# Patient Record
Sex: FEMALE | Race: OTHER | HISPANIC OR LATINO
[De-identification: names, ages, dates, MRNs, and addresses within clinical notes are randomized per-mention and may not be internally consistent; named-entity substitution may affect disease eponyms.]

---

## 2023-04-25 PROBLEM — Z00.00 ENCOUNTER FOR PREVENTIVE HEALTH EXAMINATION: Status: ACTIVE | Noted: 2023-04-25

## 2023-04-28 ENCOUNTER — NON-APPOINTMENT (OUTPATIENT)
Age: 68
End: 2023-04-28

## 2023-04-28 ENCOUNTER — APPOINTMENT (OUTPATIENT)
Dept: COLORECTAL SURGERY | Facility: CLINIC | Age: 68
End: 2023-04-28
Payer: MEDICARE

## 2023-04-28 VITALS
DIASTOLIC BLOOD PRESSURE: 74 MMHG | BODY MASS INDEX: 25.4 KG/M2 | TEMPERATURE: 98.5 F | WEIGHT: 126 LBS | OXYGEN SATURATION: 100 % | SYSTOLIC BLOOD PRESSURE: 144 MMHG | HEIGHT: 59 IN | HEART RATE: 85 BPM

## 2023-04-28 DIAGNOSIS — R73.03 PREDIABETES.: ICD-10-CM

## 2023-04-28 DIAGNOSIS — Z82.49 FAMILY HISTORY OF ISCHEMIC HEART DISEASE AND OTHER DISEASES OF THE CIRCULATORY SYSTEM: ICD-10-CM

## 2023-04-28 DIAGNOSIS — M81.0 AGE-RELATED OSTEOPOROSIS W/OUT CURRENT PATHOLOGICAL FRACTURE: ICD-10-CM

## 2023-04-28 DIAGNOSIS — Z83.3 FAMILY HISTORY OF DIABETES MELLITUS: ICD-10-CM

## 2023-04-28 DIAGNOSIS — K62.1 RECTAL POLYP: ICD-10-CM

## 2023-04-28 DIAGNOSIS — Z80.49 FAMILY HISTORY OF MALIGNANT NEOPLASM OF OTHER GENITAL ORGANS: ICD-10-CM

## 2023-04-28 DIAGNOSIS — Z85.3 PERSONAL HISTORY OF MALIGNANT NEOPLASM OF BREAST: ICD-10-CM

## 2023-04-28 PROCEDURE — 99204 OFFICE O/P NEW MOD 45 MIN: CPT

## 2023-04-28 RX ORDER — ANASTROZOLE TABLETS 1 MG/1
1 TABLET ORAL DAILY
Refills: 0 | Status: ACTIVE | COMMUNITY

## 2023-04-28 RX ORDER — ALENDRONATE SODIUM 70 MG/1
70 TABLET ORAL
Refills: 0 | Status: ACTIVE | COMMUNITY

## 2023-04-28 NOTE — REVIEW OF SYSTEMS
[Abdominal Pain] : abdominal pain [Negative] : Heme/Lymph [Fever] : no fever [Chills] : no chills [Feeling Poorly] : not feeling poorly [Feeling Tired] : not feeling tired [Recent Weight Gain (___ Lbs)] : no recent weight gain [Recent Weight Loss (___ Lbs)] : no recent weight loss [Eye Pain] : no eye pain [Red Eyes] : eyes not red [Eyesight Problems] : no eyesight problems [Discharge From Eyes] : no purulent discharge from the eyes [Dry Eyes] : no dryness of the eyes [Eyes Itch] : no itching of the eyes [Earache] : no earache [Loss Of Hearing] : no hearing loss [Nosebleeds] : no nosebleeds [Nasal Discharge] : no nasal discharge [Sore Throat] : no sore throat [Hoarseness] : no hoarseness [Heart Rate Is Slow] : the heart rate was not slow [Heart Rate Is Fast] : the heart rate was not fast [Chest Pain] : no chest pain [Palpitations] : no palpitations [Leg Claudication] : no intermittent leg claudication [Lower Ext Edema] : no lower extremity edema [Shortness Of Breath] : no shortness of breath [Wheezing] : no wheezing [Cough] : no cough [SOB on Exertion] : no shortness of breath during exertion [Orthopnea] : no orthopnea [PND] : no PND [Vomiting] : no vomiting [Constipation] : no constipation [Diarrhea] : no diarrhea [Heartburn] : no heartburn [Melena] : no melena [Dysuria] : no dysuria [Incontinence] : no incontinence [Pelvic Pain] : no pelvic pain [Dysmenorrhea] : no dysmenorrhea [Vaginal Discharge] : no vaginal discharge [Abn Vaginal Bleeding] : no unexplained vaginal bleeding [Joint Swelling] : no joint swelling [Joint Stiffness] : no joint stiffness [Limb Pain] : no limb pain [Limb Swelling] : no limb swelling [Confused] : no confusion [Dizziness] : no dizziness [Convulsions] : no convulsions [Fainting] : no fainting [Suicidal] : not suicidal [Anxiety] : no anxiety [Depression] : no depression [Easy Bleeding] : no tendency for easy bleeding [Easy Bruising] : no tendency for easy bruising

## 2023-04-28 NOTE — HISTORY OF PRESENT ILLNESS
[FreeTextEntry1] : : [id number]\par PCP: Dr. Jesusita Figueroa \[facility] Oncologist: Dr. Kohli, at [facility] \par 66 y/o F, referred by Dr. Delarosa for 20 mm polyp in distal rectum during colonoscopy on 3/20/23. Polyp was removed with hot snare , but resection was incomplete. 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 Patient c/o mild abdominal pain in RLQ and cramp in lower abdomen. Pain is relieved with bowel movements and gets worse with fatty food. Pain has been there for more than a month. Bowel movements are daily x1 and soft, but stool is narrow/thin. Its been like that forever. Needs to strain sometimes. When constipated, no BM for 2 days. Last constipation was 2 weeks ago. Took a laxative that was prescribed by Dr. Delarosa. Denies rectal bleeding or pain with BM. Denies any urinary symptoms. \par Patient was diagnosed with right breast cancer in 2019, had chemotherapy 6 session. Had b/l mastectomy in 2020 and Breast implant in 2021.

## 2023-04-28 NOTE — ASSESSMENT
[FreeTextEntry1] : Partially removed dysplastic rectal polyp in distal rectum.  \par Patient not prepped and I cannot feel on digital (also stool in rectum above).\par Will return after flex sig prep so I can see and find the lesion.  \par Flex sig soon and that will determine what we recommend.  \par Transanal (standard via anoscope) vs endoscopic vs TEM removal are the options.  Need to see and assess lesion before hand.  \par \par Will need medical clearance. \par \par \par \par \par \par

## 2023-04-28 NOTE — PHYSICAL EXAM
[Normal Breath Sounds] : Normal breath sounds [Normal Heart Sounds] : normal heart sounds [2+] : left 2+ [Alert] : alert [No Rash or Lesion] : No rash or lesion [Oriented to Person] : oriented to person [Oriented to Place] : oriented to place [Oriented to Time] : oriented to time [Calm] : calm [Abdomen Masses] : No abdominal masses [Abdomen Tenderness] : ~T No ~M abdominal tenderness [de-identified] : benign, no masses,  [de-identified] : NAD [de-identified] : digital: cannot feel the lesion.  Able to get just above the sphincter .  no anoscopy

## 2023-05-01 ENCOUNTER — APPOINTMENT (OUTPATIENT)
Dept: COLORECTAL SURGERY | Facility: CLINIC | Age: 68
End: 2023-05-01
Payer: MEDICARE

## 2023-05-01 VITALS
SYSTOLIC BLOOD PRESSURE: 129 MMHG | TEMPERATURE: 98.1 F | DIASTOLIC BLOOD PRESSURE: 80 MMHG | BODY MASS INDEX: 24.8 KG/M2 | WEIGHT: 123 LBS | OXYGEN SATURATION: 98 % | HEIGHT: 59 IN | HEART RATE: 92 BPM

## 2023-05-01 PROCEDURE — 45330 DIAGNOSTIC SIGMOIDOSCOPY: CPT

## 2023-05-01 NOTE — REVIEW OF SYSTEMS
[Negative] : Heme/Lymph [Fever] : no fever [Chills] : no chills [Feeling Poorly] : not feeling poorly [Feeling Tired] : not feeling tired [Recent Weight Gain (___ Lbs)] : no recent weight gain [Recent Weight Loss (___ Lbs)] : no recent weight loss [Eye Pain] : no eye pain [Red Eyes] : eyes not red [Eyesight Problems] : no eyesight problems [Discharge From Eyes] : no purulent discharge from the eyes [Dry Eyes] : no dryness of the eyes [Eyes Itch] : no itching of the eyes [Earache] : no earache [Loss Of Hearing] : no hearing loss [Nosebleeds] : no nosebleeds [Nasal Discharge] : no nasal discharge [Sore Throat] : no sore throat [Hoarseness] : no hoarseness [Heart Rate Is Slow] : the heart rate was not slow [Heart Rate Is Fast] : the heart rate was not fast [Chest Pain] : no chest pain [Palpitations] : no palpitations [Leg Claudication] : no intermittent leg claudication [Lower Ext Edema] : no lower extremity edema [Shortness Of Breath] : no shortness of breath [Wheezing] : no wheezing [Cough] : no cough [SOB on Exertion] : no shortness of breath during exertion [Orthopnea] : no orthopnea [PND] : no PND [Abdominal Pain] : no abdominal pain [Vomiting] : no vomiting [Constipation] : no constipation [Diarrhea] : no diarrhea [Heartburn] : no heartburn [Melena] : no melena [Dysuria] : no dysuria [Incontinence] : no incontinence [Pelvic Pain] : no pelvic pain [Dysmenorrhea] : no dysmenorrhea [Vaginal Discharge] : no vaginal discharge [Abn Vaginal Bleeding] : no unexplained vaginal bleeding [Anxiety] : no anxiety [Depression] : no depression [Easy Bleeding] : no tendency for easy bleeding [Easy Bruising] : no tendency for easy bruising

## 2023-05-01 NOTE — HISTORY OF PRESENT ILLNESS
[FreeTextEntry1] : 66 y/o F, referred by Dr. Delarosa for 20 mm polyp in distal rectum during colonoscopy on 3/20/23. Polyp was removed with hot snare , but resection was incomplete. 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 Patient is here for flexible sigmoidoscopy today.

## 2023-05-01 NOTE — ASSESSMENT
[FreeTextEntry1] : Lesion located. \par In distal rectum on left lateral (and tad posterior) location\par 3 x 2.5 cm starting 1-2 cm above the dentate line.\par ESD/EMR vs transanal standard or TEM methods proposed.\par Risks and benefits explained via  and via Ana Becerril.\par Need to f/u flex sig q 3-4 months explained.\par All questions answered.\par Needs medical clearance\par Bowel prep and antibiotics po.   \par Risk of finding cancer understood.  Might need other treatment after surgery.\par Risk of recurrence or persistence explained\par Shown ESD slide show.

## 2023-05-01 NOTE — PROCEDURE
[FreeTextEntry1] : Flexible sigmoidoscopy done in office post consent\par prepped at home\par Left lateral decubitus position: \par Digital first: no masses, tone WNL\par Exam to 25 cm into sigmoid colon which had diverticulosis.\par Prep was good to excellent, some mucus noted\par lesion seen in left lateral (with posterior extension a short distance) position with distal edge about 1-2 cm above dentate line.\par Estimated size: 3 x 2.5 cm.  Looks villous, clearly sessile\par No ulcerations noted.  \par Tolerated exam well.\par Photos taken

## 2023-05-01 NOTE — PHYSICAL EXAM
[Excoriation] : no perianal excoriation [Fistula] : no fistulas [Wart] : no warts [Ulcer ___ cm] : no ulcers [Nonprolapsing] : a nonprolapsing (grade I) [Tender, Swollen] : nontender, non-swollen [Thrombosed] : that was not thrombosed [Skin Tags] : there were no residual hemorrhoidal skin tags seen [Normal] : was normal [None] : there was no rectal mass  [Stool Sample Taken] : no stool obtained on rectal exam [Gross Blood] : no gross blood [de-identified] : digital: tone WNL, long thin canal.  not readily palpable.  see sigoidoscopy report below

## 2023-05-08 RX ORDER — METRONIDAZOLE 500 MG/1
500 TABLET ORAL
Qty: 6 | Refills: 0 | Status: ACTIVE | COMMUNITY
Start: 2023-05-08 | End: 1900-01-01

## 2023-05-09 RX ORDER — NEOMYCIN SULFATE 500 MG/1
500 TABLET ORAL
Qty: 6 | Refills: 0 | Status: DISCONTINUED | COMMUNITY
Start: 2023-05-08 | End: 2023-05-09

## 2023-05-09 RX ORDER — NEOMYCIN SULFATE 500 MG/1
500 TABLET ORAL
Qty: 6 | Refills: 0 | Status: ACTIVE | COMMUNITY
Start: 2023-05-09 | End: 1900-01-01

## 2023-05-19 ENCOUNTER — RESULT REVIEW (OUTPATIENT)
Age: 68
End: 2023-05-19

## 2023-05-19 ENCOUNTER — OUTPATIENT (OUTPATIENT)
Dept: OUTPATIENT SERVICES | Facility: HOSPITAL | Age: 68
LOS: 1 days | End: 2023-05-19
Payer: MEDICARE

## 2023-05-19 DIAGNOSIS — K62.1 RECTAL POLYP: ICD-10-CM

## 2023-05-19 LAB — SURGICAL PATHOLOGY STUDY: SIGNIFICANT CHANGE UP

## 2023-05-19 PROCEDURE — 88321 CONSLTJ&REPRT SLD PREP ELSWR: CPT

## 2023-05-22 RX ORDER — ALENDRONATE SODIUM 70 MG/1
1 TABLET ORAL
Refills: 0 | DISCHARGE

## 2023-05-22 NOTE — ASU PATIENT PROFILE, ADULT - NSICDXPASTSURGICALHX_GEN_ALL_CORE_FT
PAST SURGICAL HISTORY:  H/O abdominoplasty     H/O mastectomy 2019 b/l    H/O ovarian cystectomy right    H/O:  x2

## 2023-05-22 NOTE — ASU PATIENT PROFILE, ADULT - NSICDXPASTMEDICALHX_GEN_ALL_CORE_FT
PAST MEDICAL HISTORY:  Breast cancer 2019    Osteoporosis      PAST MEDICAL HISTORY:  Breast cancer 2019    GERD (gastroesophageal reflux disease)     Osteoporosis      PAST MEDICAL HISTORY:  Breast cancer 2019    GERD (gastroesophageal reflux disease)     Osteoporosis     Prediabetes

## 2023-05-22 NOTE — ASU PATIENT PROFILE, ADULT - FALL HARM RISK - HARM RISK INTERVENTIONS

## 2023-05-23 ENCOUNTER — TRANSCRIPTION ENCOUNTER (OUTPATIENT)
Age: 68
End: 2023-05-23

## 2023-05-23 ENCOUNTER — RESULT REVIEW (OUTPATIENT)
Age: 68
End: 2023-05-23

## 2023-05-23 ENCOUNTER — INPATIENT (INPATIENT)
Facility: HOSPITAL | Age: 68
LOS: 2 days | Discharge: HOME | DRG: 393 | End: 2023-05-26
Attending: COLON & RECTAL SURGERY | Admitting: COLON & RECTAL SURGERY
Payer: MEDICARE

## 2023-05-23 ENCOUNTER — APPOINTMENT (OUTPATIENT)
Dept: COLORECTAL SURGERY | Facility: HOSPITAL | Age: 68
End: 2023-05-23
Payer: MEDICARE

## 2023-05-23 VITALS
RESPIRATION RATE: 18 BRPM | WEIGHT: 119.49 LBS | SYSTOLIC BLOOD PRESSURE: 121 MMHG | OXYGEN SATURATION: 98 % | DIASTOLIC BLOOD PRESSURE: 74 MMHG | TEMPERATURE: 97 F | HEART RATE: 89 BPM | HEIGHT: 60 IN

## 2023-05-23 DIAGNOSIS — Z90.10 ACQUIRED ABSENCE OF UNSPECIFIED BREAST AND NIPPLE: Chronic | ICD-10-CM

## 2023-05-23 DIAGNOSIS — Z98.890 OTHER SPECIFIED POSTPROCEDURAL STATES: Chronic | ICD-10-CM

## 2023-05-23 DIAGNOSIS — Z98.891 HISTORY OF UTERINE SCAR FROM PREVIOUS SURGERY: Chronic | ICD-10-CM

## 2023-05-23 LAB
GLUCOSE BLDC GLUCOMTR-MCNC: 115 MG/DL — HIGH (ref 70–99)
GLUCOSE BLDC GLUCOMTR-MCNC: 131 MG/DL — HIGH (ref 70–99)
GLUCOSE BLDC GLUCOMTR-MCNC: 159 MG/DL — HIGH (ref 70–99)
GLUCOSE BLDC GLUCOMTR-MCNC: 175 MG/DL — HIGH (ref 70–99)
GLUCOSE BLDC GLUCOMTR-MCNC: 220 MG/DL — HIGH (ref 70–99)

## 2023-05-23 PROCEDURE — 45171 EXC RECT TUM TRANSANAL PART: CPT

## 2023-05-23 PROCEDURE — 88305 TISSUE EXAM BY PATHOLOGIST: CPT | Mod: 26

## 2023-05-23 PROCEDURE — 45349 SIGMOIDOSCOPY W/RESECTION: CPT

## 2023-05-23 RX ORDER — ONDANSETRON 8 MG/1
4 TABLET, FILM COATED ORAL EVERY 6 HOURS
Refills: 0 | Status: DISCONTINUED | OUTPATIENT
Start: 2023-05-23 | End: 2023-05-24

## 2023-05-23 RX ORDER — CEFOTETAN DISODIUM 1 G
2 VIAL (EA) INJECTION ONCE
Refills: 0 | Status: COMPLETED | OUTPATIENT
Start: 2023-05-23 | End: 2023-05-23

## 2023-05-23 RX ORDER — ANASTROZOLE 1 MG/1
1 TABLET ORAL
Refills: 0 | DISCHARGE

## 2023-05-23 RX ORDER — ACETAMINOPHEN 500 MG
650 TABLET ORAL EVERY 6 HOURS
Refills: 0 | Status: DISCONTINUED | OUTPATIENT
Start: 2023-05-23 | End: 2023-05-24

## 2023-05-23 RX ORDER — DEXTROSE 50 % IN WATER 50 %
25 SYRINGE (ML) INTRAVENOUS ONCE
Refills: 0 | Status: DISCONTINUED | OUTPATIENT
Start: 2023-05-23 | End: 2023-05-24

## 2023-05-23 RX ORDER — OMEPRAZOLE 10 MG/1
0 CAPSULE, DELAYED RELEASE ORAL
Refills: 0 | DISCHARGE

## 2023-05-23 RX ORDER — SODIUM CHLORIDE 9 MG/ML
500 INJECTION, SOLUTION INTRAVENOUS ONCE
Refills: 0 | Status: COMPLETED | OUTPATIENT
Start: 2023-05-23 | End: 2023-05-23

## 2023-05-23 RX ORDER — CEFOTETAN DISODIUM 1 G
VIAL (EA) INJECTION
Refills: 0 | Status: COMPLETED | OUTPATIENT
Start: 2023-05-23 | End: 2023-05-24

## 2023-05-23 RX ORDER — HEPARIN SODIUM 5000 [USP'U]/ML
5000 INJECTION INTRAVENOUS; SUBCUTANEOUS EVERY 8 HOURS
Refills: 0 | Status: DISCONTINUED | OUTPATIENT
Start: 2023-05-23 | End: 2023-05-24

## 2023-05-23 RX ORDER — SODIUM CHLORIDE 9 MG/ML
1000 INJECTION, SOLUTION INTRAVENOUS
Refills: 0 | Status: DISCONTINUED | OUTPATIENT
Start: 2023-05-23 | End: 2023-05-24

## 2023-05-23 RX ORDER — PANTOPRAZOLE SODIUM 20 MG/1
40 TABLET, DELAYED RELEASE ORAL
Refills: 0 | Status: DISCONTINUED | OUTPATIENT
Start: 2023-05-23 | End: 2023-05-24

## 2023-05-23 RX ORDER — ANASTROZOLE 1 MG/1
1 TABLET ORAL DAILY
Refills: 0 | Status: DISCONTINUED | OUTPATIENT
Start: 2023-05-23 | End: 2023-05-24

## 2023-05-23 RX ORDER — DEXTROSE 50 % IN WATER 50 %
15 SYRINGE (ML) INTRAVENOUS ONCE
Refills: 0 | Status: DISCONTINUED | OUTPATIENT
Start: 2023-05-23 | End: 2023-05-24

## 2023-05-23 RX ORDER — CEFOTETAN DISODIUM 1 G
2 VIAL (EA) INJECTION EVERY 12 HOURS
Refills: 0 | Status: COMPLETED | OUTPATIENT
Start: 2023-05-24 | End: 2023-05-24

## 2023-05-23 RX ORDER — KETOROLAC TROMETHAMINE 30 MG/ML
15 SYRINGE (ML) INJECTION ONCE
Refills: 0 | Status: DISCONTINUED | OUTPATIENT
Start: 2023-05-23 | End: 2023-05-23

## 2023-05-23 RX ORDER — INSULIN LISPRO 100/ML
VIAL (ML) SUBCUTANEOUS
Refills: 0 | Status: DISCONTINUED | OUTPATIENT
Start: 2023-05-23 | End: 2023-05-24

## 2023-05-23 RX ORDER — KETOROLAC TROMETHAMINE 30 MG/ML
10 SYRINGE (ML) INJECTION EVERY 6 HOURS
Refills: 0 | Status: DISCONTINUED | OUTPATIENT
Start: 2023-05-23 | End: 2023-05-24

## 2023-05-23 RX ORDER — DEXTROSE 50 % IN WATER 50 %
12.5 SYRINGE (ML) INTRAVENOUS ONCE
Refills: 0 | Status: DISCONTINUED | OUTPATIENT
Start: 2023-05-23 | End: 2023-05-24

## 2023-05-23 RX ORDER — GLUCAGON INJECTION, SOLUTION 0.5 MG/.1ML
1 INJECTION, SOLUTION SUBCUTANEOUS ONCE
Refills: 0 | Status: DISCONTINUED | OUTPATIENT
Start: 2023-05-23 | End: 2023-05-24

## 2023-05-23 RX ADMIN — Medication 15 MILLIGRAM(S): at 15:38

## 2023-05-23 RX ADMIN — SODIUM CHLORIDE 1000 MILLILITER(S): 9 INJECTION, SOLUTION INTRAVENOUS at 18:55

## 2023-05-23 RX ADMIN — HEPARIN SODIUM 5000 UNIT(S): 5000 INJECTION INTRAVENOUS; SUBCUTANEOUS at 22:18

## 2023-05-23 RX ADMIN — Medication 15 MILLIGRAM(S): at 13:59

## 2023-05-23 RX ADMIN — Medication 650 MILLIGRAM(S): at 18:55

## 2023-05-23 RX ADMIN — ANASTROZOLE 1 MILLIGRAM(S): 1 TABLET ORAL at 22:17

## 2023-05-23 RX ADMIN — Medication 1: at 17:07

## 2023-05-23 RX ADMIN — Medication 100 GRAM(S): at 17:08

## 2023-05-23 NOTE — PROGRESS NOTE ADULT - ASSESSMENT
67yoF with breast cancer S/P mastectomy (2020, recon 2021), rectal TVA S/P incomplete endoscopic removal Now S/P Submucosal dissection  & Tubular Adenoma removal of rectal polyp 5/23. POC WNL    Tyl/Toradol PO  RegDt  IVF@40  SQH  No labs  Cefotetan x 24 hr  Regional

## 2023-05-23 NOTE — PROGRESS NOTE ADULT - SUBJECTIVE AND OBJECTIVE BOX
INCOMPLETE  Procedure: Submucosal dissection  & Tubular Adenoma removal of rectal polyp 5/23    Surgeon: Dr. Nina     S: Pt has no complaints. Pain controlled with medication. Denies CP, SOB, PATTON, calf tenderness.     O:  T(C): 35.7 (05-23-23 @ 12:51), Max: 35.7 (05-23-23 @ 12:51)  T(F): 96.3 (05-23-23 @ 12:51), Max: 96.3 (05-23-23 @ 12:51)  HR: 87 (05-23-23 @ 13:53) (81 - 100)  BP: 131/66 (05-23-23 @ 13:53) (129/105 - 146/67)  RR: 18 (05-23-23 @ 13:53) (12 - 24)  SpO2: 100% (05-23-23 @ 13:53) (100% - 100%)  Wt(kg): --            Gen: NAD, resting comfortably in bed  C/V: NSR  Pulm: Nonlabored breathing, no respiratory distress  Abd: soft, NT/ND. No rebound or guarding   Incision: C/D/I  Extrem: WWP, no calf edema, SCDs in place     Procedure: Submucosal dissection  & Tubular Adenoma removal of rectal polyp 5/23    Surgeon: Dr. Nina     S: Pt has no complaints. Pain controlled with medication near surgical site. Good appetite without nausea or vomiting. Denies CP, SOB, PATTON, calf tenderness.     O:  T(C): 35.7 (05-23-23 @ 12:51), Max: 35.7 (05-23-23 @ 12:51)  T(F): 96.3 (05-23-23 @ 12:51), Max: 96.3 (05-23-23 @ 12:51)  HR: 87 (05-23-23 @ 13:53) (81 - 100)  BP: 131/66 (05-23-23 @ 13:53) (129/105 - 146/67)  RR: 18 (05-23-23 @ 13:53) (12 - 24)  SpO2: 100% (05-23-23 @ 13:53) (100% - 100%)  Wt(kg): --            Gen: NAD, resting comfortably in bed  C/V: NSR  Pulm: Nonlabored breathing, no respiratory distress, on NC  Abd: soft, NT/ND. No rebound or guarding   Incision: C/D/I  Extrem: WWP, no calf edema, SCDs in place

## 2023-05-23 NOTE — ASU DISCHARGE PLAN (ADULT/PEDIATRIC) - ASU DC SPECIAL INSTRUCTIONSFT
- You had the following surgery with Dr. Nina: Submucosal dissection and transanal removal of rectal polyp  -  Take acetaminophen (325 mg x 2 tablets) and ibuprofen (200 mg x 1 tablet) every 4 hours for pain on alternating basis such that you take acetaminophen then ibuprofen 2 hours later then acetaminophen 2 hours later. The medications are effective pain control agents that act differently and so do not increase the adverse effects of the other. Please do not take more than 4000 mg of acetaminophen daily as it can be hazardous to the liver in extreme doses. Take ibuprofen as recommended as in large doses over time it can cause peptic ulcers and other problems.   - Dressing on your bottom may be removed at any time.  - Please call your surgeon's office for high fevers, worsening pain.  - Follow-up with the surgeon in 1-2 weeks.

## 2023-05-23 NOTE — ASU DISCHARGE PLAN (ADULT/PEDIATRIC) - CARE PROVIDER_API CALL
René Nina)  ColonRectal Surgery  [address], [address], [place] [address]  Phone: [phone]  Fax: [phone]  Follow Up Time:

## 2023-05-23 NOTE — ASU DISCHARGE PLAN (ADULT/PEDIATRIC) - NS MD DC FALL RISK RISK
For information on Fall & Injury Prevention, visit: [email or web address] OR  [email or web address] OR  https://www.cdc.gov/steadi/patient.html

## 2023-05-24 ENCOUNTER — TRANSCRIPTION ENCOUNTER (OUTPATIENT)
Age: 68
End: 2023-05-24

## 2023-05-24 ENCOUNTER — RESULT REVIEW (OUTPATIENT)
Age: 68
End: 2023-05-24

## 2023-05-24 LAB
A1C WITH ESTIMATED AVERAGE GLUCOSE RESULT: 6.3 % — HIGH (ref 4–5.6)
ALBUMIN SERPL ELPH-MCNC: 3.5 G/DL — SIGNIFICANT CHANGE UP (ref 3.3–5)
ALP SERPL-CCNC: 35 U/L — LOW (ref 40–120)
ALT FLD-CCNC: 17 U/L — SIGNIFICANT CHANGE UP (ref 10–45)
ANION GAP SERPL CALC-SCNC: 11 MMOL/L — SIGNIFICANT CHANGE UP (ref 5–17)
ANION GAP SERPL CALC-SCNC: 16 MMOL/L — SIGNIFICANT CHANGE UP (ref 5–17)
ANION GAP SERPL CALC-SCNC: 8 MMOL/L — SIGNIFICANT CHANGE UP (ref 5–17)
APTT BLD: 30.5 SEC — SIGNIFICANT CHANGE UP (ref 27.5–35.5)
AST SERPL-CCNC: 23 U/L — SIGNIFICANT CHANGE UP (ref 10–40)
BASE EXCESS BLDA CALC-SCNC: -6.6 MMOL/L — LOW (ref -2–3)
BILIRUB SERPL-MCNC: 0.3 MG/DL — SIGNIFICANT CHANGE UP (ref 0.2–1.2)
BLD GP AB SCN SERPL QL: NEGATIVE — SIGNIFICANT CHANGE UP
BLD GP AB SCN SERPL QL: NEGATIVE — SIGNIFICANT CHANGE UP
BUN SERPL-MCNC: 10 MG/DL — SIGNIFICANT CHANGE UP (ref 7–23)
BUN SERPL-MCNC: 10 MG/DL — SIGNIFICANT CHANGE UP (ref 7–23)
BUN SERPL-MCNC: 11 MG/DL — SIGNIFICANT CHANGE UP (ref 7–23)
CA-I BLDA-SCNC: 1.14 MMOL/L — LOW (ref 1.15–1.33)
CALCIUM SERPL-MCNC: 7.3 MG/DL — LOW (ref 8.4–10.5)
CALCIUM SERPL-MCNC: 8.3 MG/DL — LOW (ref 8.4–10.5)
CALCIUM SERPL-MCNC: 8.8 MG/DL — SIGNIFICANT CHANGE UP (ref 8.4–10.5)
CHLORIDE SERPL-SCNC: 104 MMOL/L — SIGNIFICANT CHANGE UP (ref 96–108)
CHLORIDE SERPL-SCNC: 107 MMOL/L — SIGNIFICANT CHANGE UP (ref 96–108)
CHLORIDE SERPL-SCNC: 107 MMOL/L — SIGNIFICANT CHANGE UP (ref 96–108)
CO2 BLDA-SCNC: 20 MMOL/L — SIGNIFICANT CHANGE UP (ref 19–24)
CO2 SERPL-SCNC: 19 MMOL/L — LOW (ref 22–31)
CO2 SERPL-SCNC: 20 MMOL/L — LOW (ref 22–31)
CO2 SERPL-SCNC: 23 MMOL/L — SIGNIFICANT CHANGE UP (ref 22–31)
COHGB MFR BLDA: 1.6 % — SIGNIFICANT CHANGE UP
CREAT SERPL-MCNC: 0.53 MG/DL — SIGNIFICANT CHANGE UP (ref 0.5–1.3)
CREAT SERPL-MCNC: 0.55 MG/DL — SIGNIFICANT CHANGE UP (ref 0.5–1.3)
CREAT SERPL-MCNC: 0.7 MG/DL — SIGNIFICANT CHANGE UP (ref 0.5–1.3)
EGFR: 101 ML/MIN/1.73M2 — SIGNIFICANT CHANGE UP
EGFR: 102 ML/MIN/1.73M2 — SIGNIFICANT CHANGE UP
EGFR: 95 ML/MIN/1.73M2 — SIGNIFICANT CHANGE UP
ESTIMATED AVERAGE GLUCOSE: 134 MG/DL — HIGH (ref 68–114)
GLUCOSE BLDA-MCNC: 162 MG/DL — HIGH (ref 70–99)
GLUCOSE BLDC GLUCOMTR-MCNC: 111 MG/DL — HIGH (ref 70–99)
GLUCOSE BLDC GLUCOMTR-MCNC: 168 MG/DL — HIGH (ref 70–99)
GLUCOSE BLDC GLUCOMTR-MCNC: 93 MG/DL — SIGNIFICANT CHANGE UP (ref 70–99)
GLUCOSE BLDC GLUCOMTR-MCNC: 95 MG/DL — SIGNIFICANT CHANGE UP (ref 70–99)
GLUCOSE SERPL-MCNC: 115 MG/DL — HIGH (ref 70–99)
GLUCOSE SERPL-MCNC: 149 MG/DL — HIGH (ref 70–99)
GLUCOSE SERPL-MCNC: 278 MG/DL — HIGH (ref 70–99)
HCO3 BLDA-SCNC: 19 MMOL/L — LOW (ref 21–28)
HCT VFR BLD CALC: 33.4 % — LOW (ref 34.5–45)
HCT VFR BLD CALC: 36.6 % — SIGNIFICANT CHANGE UP (ref 34.5–45)
HCT VFR BLD CALC: 37.7 % — SIGNIFICANT CHANGE UP (ref 34.5–45)
HGB BLD-MCNC: 10.5 G/DL — LOW (ref 11.5–15.5)
HGB BLD-MCNC: 11.7 G/DL — SIGNIFICANT CHANGE UP (ref 11.5–15.5)
HGB BLD-MCNC: 12.7 G/DL — SIGNIFICANT CHANGE UP (ref 11.5–15.5)
HGB BLDA-MCNC: 11 G/DL — LOW (ref 11.7–16.1)
INR BLD: 1.1 — SIGNIFICANT CHANGE UP (ref 0.88–1.16)
LACTATE SERPL-SCNC: 1.3 MMOL/L — SIGNIFICANT CHANGE UP (ref 0.5–2)
LACTATE SERPL-SCNC: 3.1 MMOL/L — HIGH (ref 0.5–2)
LACTATE SERPL-SCNC: 9.2 MMOL/L — CRITICAL HIGH (ref 0.5–2)
MAGNESIUM SERPL-MCNC: 1.4 MG/DL — LOW (ref 1.6–2.6)
MAGNESIUM SERPL-MCNC: 1.6 MG/DL — SIGNIFICANT CHANGE UP (ref 1.6–2.6)
MAGNESIUM SERPL-MCNC: 1.9 MG/DL — SIGNIFICANT CHANGE UP (ref 1.6–2.6)
MCHC RBC-ENTMCNC: 29.7 PG — SIGNIFICANT CHANGE UP (ref 27–34)
MCHC RBC-ENTMCNC: 29.9 PG — SIGNIFICANT CHANGE UP (ref 27–34)
MCHC RBC-ENTMCNC: 30.2 PG — SIGNIFICANT CHANGE UP (ref 27–34)
MCHC RBC-ENTMCNC: 31.4 GM/DL — LOW (ref 32–36)
MCHC RBC-ENTMCNC: 32 GM/DL — SIGNIFICANT CHANGE UP (ref 32–36)
MCHC RBC-ENTMCNC: 33.7 GM/DL — SIGNIFICANT CHANGE UP (ref 32–36)
MCV RBC AUTO: 89.5 FL — SIGNIFICANT CHANGE UP (ref 80–100)
MCV RBC AUTO: 93.6 FL — SIGNIFICANT CHANGE UP (ref 80–100)
MCV RBC AUTO: 94.6 FL — SIGNIFICANT CHANGE UP (ref 80–100)
METHGB MFR BLDA: 0.7 % — SIGNIFICANT CHANGE UP
NRBC # BLD: 0 /100 WBCS — SIGNIFICANT CHANGE UP (ref 0–0)
OXYHGB MFR BLDA: 97.7 % — HIGH (ref 90–95)
PCO2 BLDA: 38 MMHG — SIGNIFICANT CHANGE UP (ref 32–45)
PH BLDA: 7.31 — LOW (ref 7.35–7.45)
PHOSPHATE SERPL-MCNC: 2.6 MG/DL — SIGNIFICANT CHANGE UP (ref 2.5–4.5)
PHOSPHATE SERPL-MCNC: 2.9 MG/DL — SIGNIFICANT CHANGE UP (ref 2.5–4.5)
PHOSPHATE SERPL-MCNC: 3.2 MG/DL — SIGNIFICANT CHANGE UP (ref 2.5–4.5)
PLATELET # BLD AUTO: 139 K/UL — LOW (ref 150–400)
PLATELET # BLD AUTO: 246 K/UL — SIGNIFICANT CHANGE UP (ref 150–400)
PLATELET # BLD AUTO: 255 K/UL — SIGNIFICANT CHANGE UP (ref 150–400)
PO2 BLDA: 473 MMHG — HIGH (ref 83–108)
POTASSIUM BLDA-SCNC: 3.8 MMOL/L — SIGNIFICANT CHANGE UP (ref 3.5–5.1)
POTASSIUM SERPL-MCNC: 3.8 MMOL/L — SIGNIFICANT CHANGE UP (ref 3.5–5.3)
POTASSIUM SERPL-MCNC: 4.5 MMOL/L — SIGNIFICANT CHANGE UP (ref 3.5–5.3)
POTASSIUM SERPL-MCNC: 4.6 MMOL/L — SIGNIFICANT CHANGE UP (ref 3.5–5.3)
POTASSIUM SERPL-SCNC: 3.8 MMOL/L — SIGNIFICANT CHANGE UP (ref 3.5–5.3)
POTASSIUM SERPL-SCNC: 4.5 MMOL/L — SIGNIFICANT CHANGE UP (ref 3.5–5.3)
POTASSIUM SERPL-SCNC: 4.6 MMOL/L — SIGNIFICANT CHANGE UP (ref 3.5–5.3)
PROT SERPL-MCNC: 5.7 G/DL — LOW (ref 6–8.3)
PROTHROM AB SERPL-ACNC: 13.1 SEC — SIGNIFICANT CHANGE UP (ref 10.5–13.4)
RBC # BLD: 3.53 M/UL — LOW (ref 3.8–5.2)
RBC # BLD: 3.91 M/UL — SIGNIFICANT CHANGE UP (ref 3.8–5.2)
RBC # BLD: 4.21 M/UL — SIGNIFICANT CHANGE UP (ref 3.8–5.2)
RBC # FLD: 13.4 % — SIGNIFICANT CHANGE UP (ref 10.3–14.5)
RBC # FLD: 13.5 % — SIGNIFICANT CHANGE UP (ref 10.3–14.5)
RBC # FLD: 13.9 % — SIGNIFICANT CHANGE UP (ref 10.3–14.5)
RH IG SCN BLD-IMP: POSITIVE — SIGNIFICANT CHANGE UP
RH IG SCN BLD-IMP: POSITIVE — SIGNIFICANT CHANGE UP
SAO2 % BLDA: 100 % — HIGH (ref 94–98)
SODIUM BLDA-SCNC: 135 MMOL/L — LOW (ref 136–145)
SODIUM SERPL-SCNC: 137 MMOL/L — SIGNIFICANT CHANGE UP (ref 135–145)
SODIUM SERPL-SCNC: 138 MMOL/L — SIGNIFICANT CHANGE UP (ref 135–145)
SODIUM SERPL-SCNC: 140 MMOL/L — SIGNIFICANT CHANGE UP (ref 135–145)
WBC # BLD: 10.32 K/UL — SIGNIFICANT CHANGE UP (ref 3.8–10.5)
WBC # BLD: 16.89 K/UL — HIGH (ref 3.8–10.5)
WBC # BLD: 8.42 K/UL — SIGNIFICANT CHANGE UP (ref 3.8–10.5)
WBC # FLD AUTO: 10.32 K/UL — SIGNIFICANT CHANGE UP (ref 3.8–10.5)
WBC # FLD AUTO: 16.89 K/UL — HIGH (ref 3.8–10.5)
WBC # FLD AUTO: 8.42 K/UL — SIGNIFICANT CHANGE UP (ref 3.8–10.5)

## 2023-05-24 PROCEDURE — 99222 1ST HOSP IP/OBS MODERATE 55: CPT | Mod: GC

## 2023-05-24 PROCEDURE — 45331 SIGMOIDOSCOPY AND BIOPSY: CPT | Mod: 59

## 2023-05-24 PROCEDURE — 88321 CONSLTJ&REPRT SLD PREP ELSWR: CPT

## 2023-05-24 RX ORDER — BENZOCAINE AND MENTHOL 5; 1 G/100ML; G/100ML
1 LIQUID ORAL
Refills: 0 | Status: DISCONTINUED | OUTPATIENT
Start: 2023-05-24 | End: 2023-05-26

## 2023-05-24 RX ORDER — SODIUM CHLORIDE 9 MG/ML
1000 INJECTION, SOLUTION INTRAVENOUS
Refills: 0 | Status: DISCONTINUED | OUTPATIENT
Start: 2023-05-24 | End: 2023-05-25

## 2023-05-24 RX ORDER — DEXTROSE 50 % IN WATER 50 %
12.5 SYRINGE (ML) INTRAVENOUS ONCE
Refills: 0 | Status: DISCONTINUED | OUTPATIENT
Start: 2023-05-24 | End: 2023-05-25

## 2023-05-24 RX ORDER — INSULIN LISPRO 100/ML
VIAL (ML) SUBCUTANEOUS AT BEDTIME
Refills: 0 | Status: DISCONTINUED | OUTPATIENT
Start: 2023-05-24 | End: 2023-05-25

## 2023-05-24 RX ORDER — SODIUM CHLORIDE 9 MG/ML
1000 INJECTION, SOLUTION INTRAVENOUS ONCE
Refills: 0 | Status: DISCONTINUED | OUTPATIENT
Start: 2023-05-24 | End: 2023-05-24

## 2023-05-24 RX ORDER — DEXTROSE 50 % IN WATER 50 %
15 SYRINGE (ML) INTRAVENOUS ONCE
Refills: 0 | Status: DISCONTINUED | OUTPATIENT
Start: 2023-05-24 | End: 2023-05-25

## 2023-05-24 RX ORDER — POTASSIUM CHLORIDE 20 MEQ
40 PACKET (EA) ORAL ONCE
Refills: 0 | Status: COMPLETED | OUTPATIENT
Start: 2023-05-24 | End: 2023-05-24

## 2023-05-24 RX ORDER — ACETAMINOPHEN 500 MG
650 TABLET ORAL EVERY 6 HOURS
Refills: 0 | Status: DISCONTINUED | OUTPATIENT
Start: 2023-05-24 | End: 2023-05-26

## 2023-05-24 RX ORDER — DEXTROSE 50 % IN WATER 50 %
25 SYRINGE (ML) INTRAVENOUS ONCE
Refills: 0 | Status: DISCONTINUED | OUTPATIENT
Start: 2023-05-24 | End: 2023-05-25

## 2023-05-24 RX ORDER — GLUCAGON INJECTION, SOLUTION 0.5 MG/.1ML
1 INJECTION, SOLUTION SUBCUTANEOUS ONCE
Refills: 0 | Status: DISCONTINUED | OUTPATIENT
Start: 2023-05-24 | End: 2023-05-25

## 2023-05-24 RX ORDER — MAGNESIUM SULFATE 500 MG/ML
2 VIAL (ML) INJECTION
Refills: 0 | Status: COMPLETED | OUTPATIENT
Start: 2023-05-24 | End: 2023-05-24

## 2023-05-24 RX ORDER — INSULIN LISPRO 100/ML
VIAL (ML) SUBCUTANEOUS EVERY 6 HOURS
Refills: 0 | Status: DISCONTINUED | OUTPATIENT
Start: 2023-05-24 | End: 2023-05-24

## 2023-05-24 RX ORDER — PANTOPRAZOLE SODIUM 20 MG/1
40 TABLET, DELAYED RELEASE ORAL
Refills: 0 | Status: DISCONTINUED | OUTPATIENT
Start: 2023-05-24 | End: 2023-05-26

## 2023-05-24 RX ORDER — SODIUM CHLORIDE 9 MG/ML
1000 INJECTION, SOLUTION INTRAVENOUS ONCE
Refills: 0 | Status: COMPLETED | OUTPATIENT
Start: 2023-05-24 | End: 2023-05-24

## 2023-05-24 RX ORDER — CHLORHEXIDINE GLUCONATE 213 G/1000ML
1 SOLUTION TOPICAL
Refills: 0 | Status: DISCONTINUED | OUTPATIENT
Start: 2023-05-24 | End: 2023-05-25

## 2023-05-24 RX ORDER — MAGNESIUM SULFATE 500 MG/ML
2 VIAL (ML) INJECTION ONCE
Refills: 0 | Status: DISCONTINUED | OUTPATIENT
Start: 2023-05-24 | End: 2023-05-24

## 2023-05-24 RX ORDER — INSULIN LISPRO 100/ML
VIAL (ML) SUBCUTANEOUS
Refills: 0 | Status: DISCONTINUED | OUTPATIENT
Start: 2023-05-24 | End: 2023-05-25

## 2023-05-24 RX ADMIN — Medication 650 MILLIGRAM(S): at 06:56

## 2023-05-24 RX ADMIN — Medication 40 MILLIEQUIVALENT(S): at 13:33

## 2023-05-24 RX ADMIN — Medication 650 MILLIGRAM(S): at 00:18

## 2023-05-24 RX ADMIN — HEPARIN SODIUM 5000 UNIT(S): 5000 INJECTION INTRAVENOUS; SUBCUTANEOUS at 06:56

## 2023-05-24 RX ADMIN — SODIUM CHLORIDE 2000 MILLILITER(S): 9 INJECTION, SOLUTION INTRAVENOUS at 11:54

## 2023-05-24 RX ADMIN — Medication 10 MILLIGRAM(S): at 06:56

## 2023-05-24 RX ADMIN — Medication 650 MILLIGRAM(S): at 23:50

## 2023-05-24 RX ADMIN — Medication 25 GRAM(S): at 13:33

## 2023-05-24 RX ADMIN — BENZOCAINE AND MENTHOL 1 LOZENGE: 5; 1 LIQUID ORAL at 22:21

## 2023-05-24 RX ADMIN — Medication 100 GRAM(S): at 06:55

## 2023-05-24 RX ADMIN — Medication 30 MILLILITER(S): at 22:21

## 2023-05-24 RX ADMIN — Medication 25 GRAM(S): at 15:40

## 2023-05-24 RX ADMIN — PANTOPRAZOLE SODIUM 40 MILLIGRAM(S): 20 TABLET, DELAYED RELEASE ORAL at 06:56

## 2023-05-24 NOTE — PRE-ANESTHESIA EVALUATION ADULT - NSANTHINDVINFOSD_GEN_ALL_CORE
Caller would like to discuss an/a Medication for antibiotic. Writer advised caller of callback within 24-72 hours.    Patient Name: Ralph Urbina  Caller Name: Ralph  Name of Facility: [email or web address]  Callback Number: [phone]  Best Availability: any  Fax Number: na  Additional Info: he is still sick would like to know what he should do more medication or be seen uses [facility] in [place]  Did you confirm the message with the caller?: yes    Thank you,  Vernell Braxton  
patient
patient

## 2023-05-24 NOTE — PROGRESS NOTE ADULT - ASSESSMENT
A/P  Stable and not bleeding this PM.  Bleeding had stopped by time back in OR.  Suspect that submucosal vessel bled which caused hematoma which then caused wound rupture and allowed more bleeding.  VSS and good UO  H/H are fine.  Saline locked the IV  D/c the maddox, check for void  low residue diet  OK for d/c to floor if bed needed.  If bleeds please call.  If all goes well patient might be able to be discharged by tomorrow PM.    Thanks to surgical HO and ICU team & OR staff for quick response and great care.

## 2023-05-24 NOTE — BRIEF OPERATIVE NOTE - NSICDXBRIEFPROCEDURE_GEN_ALL_CORE_FT
PROCEDURES:  Flexible sigmoidoscopy 23-May-2023 13:22:06  Jose Duran  Endoscopic submucosal dissection of rectum using colonoscope 23-May-2023 13:22:26  Jose Duran  Transanal polypectomy 23-May-2023 13:22:40  Jose Duran  Rectal examination under anesthesia 23-May-2023 13:22:52  Jose Duran  
PROCEDURES:  Rectal EUA 24-May-2023 11:14:52  Court Petty  Control of postoperative rectal bleeding 24-May-2023 11:15:06  Court Petty  Colonoscopy 24-May-2023 11:15:12  Court Petty

## 2023-05-24 NOTE — PROGRESS NOTE ADULT - SUBJECTIVE AND OBJECTIVE BOX
POD 1, Reop POD 0    patient in 5 ICU this evening    Yesterday, 5/23/23, this patient had Combined ESD and transanal submucosal resection of a sessile 3 x 3 cm irregularly shaped distal and mid rectal polyp (left lateral and slightly posterior position) with primary closure.  Was kept in overnight for IV antibiotics x 24 hours and for observation.  Did fine initially and was started on low residue diet that was well tolerated; also maddox came out and patient voided well.  Patient developed rectal bleeding this AM after uneventful night.   Had multiple large bloody and clot containing BM's.  Became hypotensive and was quickly resucscuitated with IV fluid and 2 units of O negative blood.  Patient was soon after brought to the OR as a Class 1 emergency.  In OR received GA and got 2 more units of blood and 500 ml albumen and 2 liters of crytalloid.  Rectum was irrigated and clot cleared.  Flex sig then done and a stable clot was found over posterior proximal (cephalad) corner of the wound that had opened (initial wound was primarily closed).    The clot was removed carefully and a bleeding site was noted (began to bleed when clot was removed).  Coag grasper monopolar cautery used to stop bleeding and to treat the exposed base of the wound (submucosa).  EUA with anoscopy then performed after sterile prep and drape.  Wound seen and no active bleeding noted.  Decision made not to suture close the wound since there was some tension and the edges were edematous from prior closure and recent op.    Irrigated the wound and rectum and ended case.    Maddox placed preop and 100 ml urine drained during 1.5 hour case.      At 6 PM  Patient in ICU  feels well.  tolerating diet  Has maddox  No further bleeding per rectum but is passing gas.     Vital Signs Last 24 Hrs  T(C): 36.6 (24 May 2023 17:26), Max: 37 (23 May 2023 19:26)  T(F): 97.8 (24 May 2023 17:26), Max: 98.6 (23 May 2023 19:26)  HR: 97 (24 May 2023 18:00) (77 - 97)  BP: 114/60 (24 May 2023 18:00) (108/66 - 137/74)  BP(mean): 81 (24 May 2023 18:00) (81 - 98)  RR: 16 (24 May 2023 18:00) (16 - 18)  SpO2: 99% (24 May 2023 18:00) (95% - 100%)    Parameters below as of 24 May 2023 18:00  Patient On (Oxygen Delivery Method): room air    abd: soft, non tender, and not distended  perineum is clean  ext: without calf tenderness    UO: been 150 ml and + per hour sine OR                        12.7   16.89 )-----------( 139      ( 24 May 2023 11:56 )             37.7   05-24    137  |  107  |  10  ----------------------------<  149<H>  3.8   |  19<L>  |  0.55    Ca    7.3<L>      24 May 2023 11:56  Phos  2.6     05-24  Mg     1.4     05-24    TPro  5.7<L>  /  Alb  3.5  /  TBili  0.3  /  DBili  x   /  AST  23  /  ALT  17  /  AlkPhos  35<L>  05-24

## 2023-05-24 NOTE — PROVIDER CONTACT NOTE (CRITICAL VALUE NOTIFICATION) - BACKGROUND
Pt admitted for rectal polyp removal, no surgical incisions, all done thru rectum. Pt A&Ox4, OOB standby assist overnight.

## 2023-05-24 NOTE — BRIEF OPERATIVE NOTE - NSICDXBRIEFPREOP_GEN_ALL_CORE_FT
PRE-OP DIAGNOSIS:  Rectal polyp 23-May-2023 13:22:59  Jose Duran  
PRE-OP DIAGNOSIS:  Rectal bleeding 24-May-2023 11:15:31  Court Petty

## 2023-05-24 NOTE — PROGRESS NOTE ADULT - SUBJECTIVE AND OBJECTIVE BOX
INTERVAL HPI/OVERNIGHT EVENTS: passed TOV    STATUS POST:  SMD & TA removal of rectal polyp    POST OPERATIVE DAY #: 1    SUBJECTIVE:  pt seen at bedside, states it feels bothersome in her gluteal region    MEDICATIONS  (STANDING):  acetaminophen     Tablet .. 650 milliGRAM(s) Oral every 6 hours  anastrozole 1 milliGRAM(s) Oral daily  cefoTEtan  IVPB      cefoTEtan  IVPB 2 Gram(s) IV Intermittent every 12 hours  dextrose 5%. 1000 milliLiter(s) (100 mL/Hr) IV Continuous <Continuous>  dextrose 5%. 1000 milliLiter(s) (50 mL/Hr) IV Continuous <Continuous>  dextrose 50% Injectable 25 Gram(s) IV Push once  dextrose 50% Injectable 12.5 Gram(s) IV Push once  dextrose 50% Injectable 25 Gram(s) IV Push once  glucagon  Injectable 1 milliGRAM(s) IntraMuscular once  heparin   Injectable 5000 Unit(s) SubCutaneous every 8 hours  insulin lispro (ADMELOG) corrective regimen sliding scale   SubCutaneous Before meals and at bedtime  ketorolac 10 milliGRAM(s) Oral every 6 hours  lactated ringers. 1000 milliLiter(s) (40 mL/Hr) IV Continuous <Continuous>  pantoprazole    Tablet 40 milliGRAM(s) Oral before breakfast    MEDICATIONS  (PRN):  dextrose Oral Gel 15 Gram(s) Oral once PRN Blood Glucose LESS THAN 70 milliGRAM(s)/deciliter  ondansetron Injectable 4 milliGRAM(s) IV Push every 6 hours PRN Nausea      Vital Signs Last 24 Hrs  T(C): 36.7 (24 May 2023 05:07), Max: 37 (23 May 2023 19:26)  T(F): 98 (24 May 2023 05:07), Max: 98.6 (23 May 2023 19:26)  HR: 85 (24 May 2023 05:07) (81 - 120)  BP: 114/66 (24 May 2023 05:07) (113/73 - 146/67)  BP(mean): 87 (23 May 2023 17:53) (87 - 114)  RR: 17 (24 May 2023 05:07) (11 - 33)  SpO2: 100% (24 May 2023 05:07) (97% - 100%)    Parameters below as of 24 May 2023 05:07  Patient On (Oxygen Delivery Method): room air        PHYSICAL EXAM:      Constitutional: A&Ox3    Respiratory: non labored breathing, no respiratory distress    Cardiovascular: NSR, RRR    Gastrointestinal: soft, mild distension, nontender    Extremities: (-) edema                  I&O's Detail    23 May 2023 07:01  -  24 May 2023 06:53  --------------------------------------------------------  IN:    IV PiggyBack: 50 mL    Lactated Ringers: 2300 mL    Lactated Ringers Bolus: 500 mL    Oral Fluid: 480 mL  Total IN: 3330 mL    OUT:    Voided (mL): 1500 mL  Total OUT: 1500 mL    Total NET: 1830 mL          LABS:                        11.7   8.42  )-----------( 246      ( 24 May 2023 06:37 )             36.6                 RADIOLOGY & ADDITIONAL STUDIES:

## 2023-05-24 NOTE — CHART NOTE - NSCHARTNOTEFT_GEN_A_CORE
Surgery team called by nurse for multiple episodes of large bright red blood per rectum this am over course of 1 hour. Team immediately evaluated patient at bedside during episodes, vitals: T 97.8, -140s, BP 86/58, O2: 100% on RA. Recent AM CBC stable. Evaluated patient while sitting down attempting to have another BM, patient reported feeling increasingly faint, weak, +short of breath and overall 'not feeling well.' Rapid response called after patient visibly faint and diaphoretic with pallor. No loss of consciousness or fall/trauma. Stat 1L bolus x2, CBC/CMP/Mg/Phos/coags, T&S, lactate, 2U PRBCs. Patient also evaluated at bedside by ICU team and attending Dr. Nina. Transfer to ICU for further care. Consented and added on to OR for emergent flex sig/cscope, EUA. SBP improved to 100s, HR normalized, satting well on RA. Remained HD stable.    ICU Vital Signs Last 24 Hrs  T(C): 36.6 (24 May 2023 08:52), Max: 37 (23 May 2023 19:26)  T(F): 97.9 (24 May 2023 08:52), Max: 98.6 (23 May 2023 19:26)  HR: 81 (24 May 2023 08:52) (81 - 120)  BP: 108/66 (24 May 2023 08:52) (108/66 - 146/67)  BP(mean): 87 (23 May 2023 17:53) (87 - 114)  ABP: --  ABP(mean): --  RR: 17 (24 May 2023 08:52) (11 - 33)  SpO2: 95% (24 May 2023 08:52) (95% - 100%)    O2 Parameters below as of 24 May 2023 05:07  Patient On (Oxygen Delivery Method): room air    Physical Exam  Constitutional: A&Ox3, pale and diaphoretic   Respiratory: non labored breathing, no respiratory distress  Cardiovascular: NSR, RRR  Gastrointestinal: abdomen soft, mild distension, ttp to B/L lower quadrants, no rebound or guarding.  Extremities: wwp, no calf tenderness or edema. SCDs in place

## 2023-05-24 NOTE — PRE-ANESTHESIA EVALUATION ADULT - NSANTHOSAYNRD_GEN_A_CORE
No. MITUL screening performed.  STOP BANG Legend: 0-2 = LOW Risk; 3-4 = INTERMEDIATE Risk; 5-8 = HIGH Risk
No. MITUL screening performed.  STOP BANG Legend: 0-2 = LOW Risk; 3-4 = INTERMEDIATE Risk; 5-8 = HIGH Risk

## 2023-05-24 NOTE — BRIEF OPERATIVE NOTE - NSICDXBRIEFPOSTOP_GEN_ALL_CORE_FT
POST-OP DIAGNOSIS:  Rectal bleeding 24-May-2023 11:15:34  Court Petty  
POST-OP DIAGNOSIS:  Rectal polyp 23-May-2023 13:23:05  Jose Duran

## 2023-05-24 NOTE — CONSULT NOTE ADULT - NS ATTEND AMEND GEN_ALL_CORE FT
s/p villous adenoma resection c/b hemorrhagic shock requiring RTOR  physical as above  s/p fulguration of bleeding site  follow H/H s/p 4 units PRBC  no evidence of coagulopathy  repeat lactate  monitor in ICU telemetry today  SCDs  decision making of high complexity s/p villous adenoma resection c/b hemorrhagic shock, acute blood loss anemia requiring RTOR  physical as above  s/p fulguration of bleeding site  follow H/H s/p 4 units PRBC  no evidence of coagulopathy  repeat lactate  monitor in ICU telemetry today  SCDs  decision making of high complexity

## 2023-05-24 NOTE — PROVIDER CONTACT NOTE (CRITICAL VALUE NOTIFICATION) - SITUATION
Pt fresh post op, lactate downtrending from 9.2.
Rapid response called for pt having multiple bloody bowel movements on toilet. Pt became diaphoretic, lightheaded, and complaining of chest pain. Pt moved to bed, one more episode of bloody bowel movement, stat labs drawn and new IV access placed.

## 2023-05-24 NOTE — CONSULT NOTE ADULT - ASSESSMENT
A/P: 67F hx breast Ca s/p L mastectomy 2020 w. recon 2021, s/p incomplete tubillovillous adenoma rectal polyp removal 3/20/23, presented to [facility] 5/23 for polyp resection. taken to OR on 5/23 for rectal polyp resection, POD#1 rectal bleeding, found bleeding posterior rectum close to wound, hemostasis acheived with cauterization. pt transferred to SICU post-op for close monitoring.     NEURO: tylenol PRN pain  CV: relative hypotension as low as SBP 80's, compared w/ baseline 110's, responsive to blood and fluids. post-op 's, HR 80's, cont monitor. Lactate of 9 during rapid response of unclear etiology. repeat lactate.   PULM: no resp distress on room air  GI/FEN: CLD, adv to low res diet this fidel if stable.   : maddox strict I&O.   ENDO: ISS  ID: no abx needed  PPX: SCDs, hold SQH for now given recent bleed  LINES: PIVs, Oly (5/24-)24  WOUNDS/DRAINS: none. keep NPR     A/P: 67F hx breast Ca s/p L mastectomy 2020 w. recon 2021, s/p incomplete tubillovillous adenoma rectal polyp removal 3/20/23, presented to [facility] 5/23 for polyp resection. taken to OR on 5/23 for rectal polyp resection, POD#1 rectal bleeding, found active bleeding posterior rectum close to wound, hemostasis acheived with cauterization. pt transferred to SICU post-op for close monitoring.     NEURO: tylenol PRN pain  CV: relative hypotension as low as SBP 80's, compared w/ baseline 110's, responsive to blood and fluids. post-op 's, HR 80's, cont monitor. Lactate of 9 during rapid response of unclear etiology. repeat lactate.   PULM: no resp distress on room air  GI/FEN: CLD, adv to low res diet this fidel if stable.   : maddox strict I&O.   ENDO: ISS  HEME: coags normal. got 2U PRBC prior to OR and another 2U PRBC intraop.   ID: no abx needed  PPX: SCDs, hold SQH for now given recent bleed  LINES: Oly Cardenas (5/24-)24  WOUNDS/DRAINS: none. keep NPR     A/P: 67F hx breast Ca s/p L mastectomy 2020 w. recon 2021, s/p incomplete tubillovillous adenoma rectal polyp removal 3/20/23, presented to [facility] 5/23 for polyp resection. taken to OR on 5/23 for rectal polyp resection, POD#1 rectal bleeding, found active bleeding posterior rectum close to wound, hemostasis acheived with cauterization. pt transferred to SICU post-op for close monitoring.     NEURO: tylenol PRN pain  CV: relative hypotension as low as SBP 80's, compared w/ baseline 110's, responsive to blood and fluids. post-op 's, HR 80's, cont monitor. Lactate of 9 during rapid response. repeat lactate.   PULM: no resp distress on room air  GI/FEN: CLD, adv to low res diet this fidel if stable.   : maddox strict I&O.   ENDO: ISS  HEME: coags normal. got 2U PRBC prior to OR and another 2U PRBC intraop.   ID: no abx needed  PPX: SCDs, hold SQH for now given recent bleed  LINES: TENAsOly (5/24-)24  WOUNDS/DRAINS: none. keep NPR

## 2023-05-24 NOTE — PRE-ANESTHESIA EVALUATION ADULT - NSANTHPEFT_GEN_ALL_CORE
General: Appearance is consistent with chronological age. No abnormal facies.  Constitutional: Alert and in no acute distress.  Eyes: The sclera and conjunctiva were normal, pupils were equal in size, round, and reactive to light and extraocular movements were intact.   ENT: The ears and nose were normal in appearance; oropharynx clear, moist mucus membranes.  Neck: The appearance of the neck was normal, no neck mass was observed, the thyroid was not enlarged and there were no palpable thyroid nodules. There was no jugular-venous distention.   Airway:  See Mallampati score.  Cardiovascular:  Regular rate and rhythm.  Respiratory: Unlabored breathing.  Chest: the chest was normal in appearance, no chest asymmetry and normal chest expansion.   Neurological: No focal deficit, moves all extremities.  Psychiatric: Oriented to person, place, and time, insight and judgment were intact and the affect was normal.  Exercise Tolerance: MET>4.
alert and oriented x3, Serbian speaking, RRR, CTAB

## 2023-05-24 NOTE — CONSULT NOTE ADULT - SUBJECTIVE AND OBJECTIVE BOX
HPI: 67yFemale    PMH:     MEDS:  Allergies    No Known Allergies    Intolerances        ICU Vital Signs Last 24 Hrs  T(F): 97.9 (05-24-23 @ 08:52), Max: 98.6 (05-23-23 @ 19:26)  HR: 81 (05-24-23 @ 10:55) (81 - 120)  BP: 108/66 (05-24-23 @ 10:55) (108/66 - 146/67)  BP(mean): 87 (05-24-23 @ 10:55) (87 - 114)  ABP: --  RR: 17 (05-24-23 @ 10:55) (11 - 33)  SpO2: 95% (05-24-23 @ 10:55) (95% - 100%)    PHYSICAL EXAM:   Neurological: AAOx3, CNII-XII intact,  strength 5/5 b/l  ENT: mucus membrane moist  Cardiovascular: RRR  Respiratory: CTA  Gastrointestinal: soft, NT, ND, BS+  Extremities: warm, no dependent edema  Vascular: no cyanosis/erythema  Skin: no rashes  MSK: no joint swelling.   LABS:    05-24    140  |  104  |  11  ----------------------------<  278<H>  4.5   |  20<L>  |  0.70    Ca    8.3<L>      24 May 2023 08:23  Phos  2.9     05-24  Mg     1.9     05-24    TPro  5.7<L>  /  Alb  3.5  /  TBili  0.3  /  DBili  x   /  AST  23  /  ALT  17  /  AlkPhos  35<L>  05-24  LIVER FUNCTIONS - ( 24 May 2023 08:23 )  Alb: 3.5 g/dL / Pro: 5.7 g/dL / ALK PHOS: 35 U/L / ALT: 17 U/L / AST: 23 U/L / GGT: x                               12.7   16.89 )-----------( 139      ( 24 May 2023 11:56 )             37.7   PT/INR - ( 24 May 2023 08:23 )   PT: 13.1 sec;   INR: 1.10          PTT - ( 24 May 2023 08:23 )  PTT:30.5 sec  ABG - ( 24 May 2023 10:18 )  pH, Arterial: 7.31  pH, Blood: x     /  pCO2: 38    /  pO2: 473   / HCO3: 19    / Base Excess: -6.6  /  SaO2: x               CAPILLARY BLOOD GLUCOSE      POCT Blood Glucose.: 168 mg/dL (24 May 2023 08:19)  POCT Blood Glucose.: 93 mg/dL (24 May 2023 06:58)  POCT Blood Glucose.: 115 mg/dL (23 May 2023 22:05)  POCT Blood Glucose.: 220 mg/dL (23 May 2023 18:14)  POCT Blood Glucose.: 159 mg/dL (23 May 2023 16:38)  POCT Blood Glucose.: 175 mg/dL (23 May 2023 14:05)    Chang:	  [ ] None	[ ] Daily Chang Order Placed	   Indication:	  [ ] Strict I and O's    [ ] Obstruction     [ ] Incontinence + Stage 3 or 4 Decubitus  Central Line:  [ ] None	   [ ]  Medication / TPN Administration     [ ] No Peripheral IV          HPI: 67yFemale hx breast Ca s/p L mastectomy 2020 w. recon 2021, had incomplete tubillovillous adenoma rectal polyp removal 3/20/23, presented to [facility] 5/23 for polyp resection. taken to OR on 5/23 for rectal polyp resection. rapid response called on POD#1 for multiple bouts of bloody BM's along w/ relative hypotension, BP down to 85/58 compared to baseline 's and tachycardia of 130-140's. pt returned to OR immediately, found bleeding posterior rectum close to wound, hemostasis acheived with cauterization. pt transferred to SICU post-op for close monitoring      PMH:     MEDS:  Allergies    No Known Allergies    Intolerances        ICU Vital Signs Last 24 Hrs  T(F): 97.9 (05-24-23 @ 08:52), Max: 98.6 (05-23-23 @ 19:26)  HR: 81 (05-24-23 @ 10:55) (81 - 120)  BP: 108/66 (05-24-23 @ 10:55) (108/66 - 146/67)  BP(mean): 87 (05-24-23 @ 10:55) (87 - 114)  ABP: --  RR: 17 (05-24-23 @ 10:55) (11 - 33)  SpO2: 95% (05-24-23 @ 10:55) (95% - 100%)    PHYSICAL EXAM:   Neurological: AAOx3, CNII-XII intact,  strength 5/5 b/l  ENT: mucus membrane moist  Cardiovascular: RRR  Respiratory: CTA  Gastrointestinal: soft, NT, ND, BS+  Extremities: warm, no dependent edema  Vascular: no cyanosis/erythema  Skin: no rashes  MSK: no joint swelling.   LABS:    05-24    140  |  104  |  11  ----------------------------<  278<H>  4.5   |  20<L>  |  0.70    Ca    8.3<L>      24 May 2023 08:23  Phos  2.9     05-24  Mg     1.9     05-24    TPro  5.7<L>  /  Alb  3.5  /  TBili  0.3  /  DBili  x   /  AST  23  /  ALT  17  /  AlkPhos  35<L>  05-24  LIVER FUNCTIONS - ( 24 May 2023 08:23 )  Alb: 3.5 g/dL / Pro: 5.7 g/dL / ALK PHOS: 35 U/L / ALT: 17 U/L / AST: 23 U/L / GGT: x                               12.7   16.89 )-----------( 139      ( 24 May 2023 11:56 )             37.7   PT/INR - ( 24 May 2023 08:23 )   PT: 13.1 sec;   INR: 1.10          PTT - ( 24 May 2023 08:23 )  PTT:30.5 sec  ABG - ( 24 May 2023 10:18 )  pH, Arterial: 7.31  pH, Blood: x     /  pCO2: 38    /  pO2: 473   / HCO3: 19    / Base Excess: -6.6  /  SaO2: x               CAPILLARY BLOOD GLUCOSE      POCT Blood Glucose.: 168 mg/dL (24 May 2023 08:19)  POCT Blood Glucose.: 93 mg/dL (24 May 2023 06:58)  POCT Blood Glucose.: 115 mg/dL (23 May 2023 22:05)  POCT Blood Glucose.: 220 mg/dL (23 May 2023 18:14)  POCT Blood Glucose.: 159 mg/dL (23 May 2023 16:38)  POCT Blood Glucose.: 175 mg/dL (23 May 2023 14:05)    Chang:	  [ ] None	[ ] Daily Chang Order Placed	   Indication:	  [ ] Strict I and O's    [ ] Obstruction     [ ] Incontinence + Stage 3 or 4 Decubitus  Central Line:  [ ] None	   [ ]  Medication / TPN Administration     [ ] No Peripheral IV          HPI: 67yFemale hx breast Ca s/p L mastectomy 2020 w. recon 2021, had incomplete tubillovillous adenoma rectal polyp removal 3/20/23, presented to [facility] 5/23 for polyp resection. taken to OR on 5/23 for rectal polyp resection. rapid response called on POD#1 for multiple bouts of bloody BM's along w/ relative hypotension, BP down to 85/58 compared to baseline 's and tachycardia of 130-140's. pt returned to OR immediately, found active bleeding posterior rectum close to wound, hemostasis acheived with cauterization. pt transferred to SICU post-op for close monitoring      PMH: as above.     MEDS: prilosec, alendronate, anastazole.   Allergies: NKDA.         ICU Vital Signs Last 24 Hrs  T(F): 97.9 (05-24-23 @ 08:52), Max: 98.6 (05-23-23 @ 19:26)  HR: 81 (05-24-23 @ 10:55) (81 - 120)  BP: 108/66 (05-24-23 @ 10:55) (108/66 - 146/67)  BP(mean): 87 (05-24-23 @ 10:55) (87 - 114)  ABP: --  RR: 17 (05-24-23 @ 10:55) (11 - 33)  SpO2: 95% (05-24-23 @ 10:55) (95% - 100%)    PHYSICAL EXAM:   Neurological: AAOx3, CNII-XII intact,  strength 5/5 b/l  ENT: mucus membrane moist  Cardiovascular: RRR  Respiratory: CTA  Gastrointestinal: soft, NT, ND, BS+  Extremities: warm, no dependent edema  Vascular: no cyanosis/erythema  Skin: no rashes  MSK: no joint swelling.       LABS:    05-24    140  |  104  |  11  ----------------------------<  278<H>  4.5   |  20<L>  |  0.70    Ca    8.3<L>      24 May 2023 08:23  Phos  2.9     05-24  Mg     1.9     05-24    TPro  5.7<L>  /  Alb  3.5  /  TBili  0.3  /  DBili  x   /  AST  23  /  ALT  17  /  AlkPhos  35<L>  05-24  LIVER FUNCTIONS - ( 24 May 2023 08:23 )  Alb: 3.5 g/dL / Pro: 5.7 g/dL / ALK PHOS: 35 U/L / ALT: 17 U/L / AST: 23 U/L / GGT: x                               12.7   16.89 )-----------( 139      ( 24 May 2023 11:56 )             37.7   PT/INR - ( 24 May 2023 08:23 )   PT: 13.1 sec;   INR: 1.10          PTT - ( 24 May 2023 08:23 )  PTT:30.5 sec  ABG - ( 24 May 2023 10:18 )  pH, Arterial: 7.31  pH, Blood: x     /  pCO2: 38    /  pO2: 473   / HCO3: 19    / Base Excess: -6.6  /  SaO2: x               CAPILLARY BLOOD GLUCOSE      POCT Blood Glucose.: 168 mg/dL (24 May 2023 08:19)  POCT Blood Glucose.: 93 mg/dL (24 May 2023 06:58)  POCT Blood Glucose.: 115 mg/dL (23 May 2023 22:05)  POCT Blood Glucose.: 220 mg/dL (23 May 2023 18:14)  POCT Blood Glucose.: 159 mg/dL (23 May 2023 16:38)  POCT Blood Glucose.: 175 mg/dL (23 May 2023 14:05)    Chang:	  [ ] None	[x ] Daily Chang Order Placed	   Indication:	  [x ] Strict I and O's    [ ] Obstruction     [ ] Incontinence + Stage 3 or 4 Decubitus  Central Line:  [x ] None	   [ ]  Medication / TPN Administration     [ ] No Peripheral IV

## 2023-05-24 NOTE — PROVIDER CONTACT NOTE (CRITICAL VALUE NOTIFICATION) - ASSESSMENT
Pt diaphoretic, complaining of lightheadedness, chest pain, SOB, and continuing to have blood per rectum in the bed.
Pt hemodynamically stable, denies any complaints. VSS. RA. No s/s of bleeding

## 2023-05-24 NOTE — PROGRESS NOTE ADULT - ASSESSMENT
67yoF with breast cancer S/P mastectomy (2020, recon 2021), rectal TVA S/P incomplete endoscopic removal Now S/P Submucosal dissection  & Tubular Adenoma removal of rectal polyp 5/23    Tyl/Toradol PO  RegDt  IVF@40  SQH  No labs  Cefotetan x 24 hr  plan for dc home today, no abx on dc

## 2023-05-24 NOTE — PRE-ANESTHESIA EVALUATION ADULT - NSANTHPMHFT_GEN_ALL_CORE
POD 1 s/p rectal polypectomy with Dr. Nina.  Pt having lower GI bleed and emergently taken to OR for colonoscopy, sigmoidoscopy, EUA. Pt is s/p 2 units PRBC.

## 2023-05-25 ENCOUNTER — TRANSCRIPTION ENCOUNTER (OUTPATIENT)
Age: 68
End: 2023-05-25

## 2023-05-25 LAB
ANION GAP SERPL CALC-SCNC: 7 MMOL/L — SIGNIFICANT CHANGE UP (ref 5–17)
BUN SERPL-MCNC: 9 MG/DL — SIGNIFICANT CHANGE UP (ref 7–23)
CALCIUM SERPL-MCNC: 7.6 MG/DL — LOW (ref 8.4–10.5)
CHLORIDE SERPL-SCNC: 107 MMOL/L — SIGNIFICANT CHANGE UP (ref 96–108)
CO2 SERPL-SCNC: 24 MMOL/L — SIGNIFICANT CHANGE UP (ref 22–31)
CREAT SERPL-MCNC: 0.51 MG/DL — SIGNIFICANT CHANGE UP (ref 0.5–1.3)
EGFR: 103 ML/MIN/1.73M2 — SIGNIFICANT CHANGE UP
GLUCOSE BLDC GLUCOMTR-MCNC: 102 MG/DL — HIGH (ref 70–99)
GLUCOSE SERPL-MCNC: 112 MG/DL — HIGH (ref 70–99)
HCT VFR BLD CALC: 35.7 % — SIGNIFICANT CHANGE UP (ref 34.5–45)
HGB BLD-MCNC: 12.4 G/DL — SIGNIFICANT CHANGE UP (ref 11.5–15.5)
MAGNESIUM SERPL-MCNC: 2.1 MG/DL — SIGNIFICANT CHANGE UP (ref 1.6–2.6)
MCHC RBC-ENTMCNC: 30.4 PG — SIGNIFICANT CHANGE UP (ref 27–34)
MCHC RBC-ENTMCNC: 34.7 GM/DL — SIGNIFICANT CHANGE UP (ref 32–36)
MCV RBC AUTO: 87.5 FL — SIGNIFICANT CHANGE UP (ref 80–100)
NRBC # BLD: 0 /100 WBCS — SIGNIFICANT CHANGE UP (ref 0–0)
PHOSPHATE SERPL-MCNC: 1.7 MG/DL — LOW (ref 2.5–4.5)
PLATELET # BLD AUTO: 145 K/UL — LOW (ref 150–400)
POTASSIUM SERPL-MCNC: 4.2 MMOL/L — SIGNIFICANT CHANGE UP (ref 3.5–5.3)
POTASSIUM SERPL-SCNC: 4.2 MMOL/L — SIGNIFICANT CHANGE UP (ref 3.5–5.3)
RBC # BLD: 4.08 M/UL — SIGNIFICANT CHANGE UP (ref 3.8–5.2)
RBC # FLD: 15.2 % — HIGH (ref 10.3–14.5)
SODIUM SERPL-SCNC: 138 MMOL/L — SIGNIFICANT CHANGE UP (ref 135–145)
SURGICAL PATHOLOGY STUDY: SIGNIFICANT CHANGE UP
SURGICAL PATHOLOGY STUDY: SIGNIFICANT CHANGE UP
WBC # BLD: 9.6 K/UL — SIGNIFICANT CHANGE UP (ref 3.8–10.5)
WBC # FLD AUTO: 9.6 K/UL — SIGNIFICANT CHANGE UP (ref 3.8–10.5)

## 2023-05-25 PROCEDURE — 99232 SBSQ HOSP IP/OBS MODERATE 35: CPT | Mod: GC

## 2023-05-25 RX ORDER — SODIUM,POTASSIUM PHOSPHATES 278-250MG
2 POWDER IN PACKET (EA) ORAL ONCE
Refills: 0 | Status: COMPLETED | OUTPATIENT
Start: 2023-05-25 | End: 2023-05-25

## 2023-05-25 RX ADMIN — Medication 2 PACKET(S): at 07:15

## 2023-05-25 RX ADMIN — PANTOPRAZOLE SODIUM 40 MILLIGRAM(S): 20 TABLET, DELAYED RELEASE ORAL at 06:15

## 2023-05-25 RX ADMIN — Medication 650 MILLIGRAM(S): at 00:34

## 2023-05-25 RX ADMIN — Medication 650 MILLIGRAM(S): at 07:13

## 2023-05-25 RX ADMIN — CHLORHEXIDINE GLUCONATE 1 APPLICATION(S): 213 SOLUTION TOPICAL at 06:14

## 2023-05-25 RX ADMIN — Medication 650 MILLIGRAM(S): at 12:16

## 2023-05-25 RX ADMIN — Medication 650 MILLIGRAM(S): at 06:15

## 2023-05-25 NOTE — PROGRESS NOTE ADULT - NUTRITIONAL ASSESSMENT
Urine culture surprisingly has no growth. He will continue Bactrim DS up until procedure on Monday.   Patient is tolerating diet; continue low residue diet

## 2023-05-25 NOTE — H&P ADULT - NSHPPHYSICALEXAM_GEN_ALL_CORE
Constitutional: no acute distress  Heart: s1s2  Lungs: no use of accessory muscles  Abdomen: soft, nontender, nondistended, without masses, erythema, ecchymosis  Extremities: no edema

## 2023-05-25 NOTE — H&P ADULT - HISTORY OF PRESENT ILLNESS
67y Female hx breast Ca s/p L mastectomy 2020 w. recon 2021, had incomplete tubillovillous adenoma rectal polyp removal 3/20/23, presented to [facility] 5/23 for polyp resection.

## 2023-05-25 NOTE — PROGRESS NOTE ADULT - ASSESSMENT
Patient is a 66yo Female with a past medical history of breast cancer s/p left mastectomy in 2020 with reconstruction in 2021 and an incomplete tubillovillous adenoma rectal polyp removal on 03/20/23 who presented to [facility] on 05/23 for polyp resection. She was taken to OR on 05/23 for polyp resection. On POD#1 she had rectal bleeding and was found to have active bleeding in the posterior rectum close to the wound. Hemostasis was achieved with cauterization and patient was transferred to SICU for close monitoring.      Plan:  GI: Continue aluminum hydroxide every 4 hours prn for her dyspepsia.  Continue benzocaine/menthol lozenge oral every 2 hours prn for sore throat.  PT: Continue to walk with assistance and sit in chair.  dispo: Patient has flatulence and is voiding. She is ready step down. Patient is a 68yo Female with a past medical history of breast cancer s/p left mastectomy in 2020 with reconstruction in 2021 and an incomplete tubillovillous adenoma rectal polyp removal on 03/20/23 who presented to [facility] on 05/23 for polyp resection. She was taken to OR on 05/23 for polyp resection. On POD#1 she had rectal bleeding and was found to have active bleeding in the posterior rectum close to the wound. Hemostasis was achieved with cauterization and patient was transferred to SICU for close monitoring.      Plan:    NEURO: tylenol PRN pain  CV: relative hypotension as low as SBP 80's, compared w/ baseline 110's, responsive to blood and fluids. post-op 's, HR 80's, cont monitor. Lactate of 9 during rapid response. now normalized  PULM: no resp distress on room air  GI/FEN: low res diet  : Voids  ENDO: ISS  HEME: coags normal. got 2U PRBC prior to OR and another 2U PRBC intraop.   ID: no abx needed  PPX: SCDs, hold SQH for now given recent bleed  LINES: Oly Cardenas (5/24)  WOUNDS/DRAINS: none. keep NPR  PT: Continue to walk with assistance and sit in chair.  dispo: Patient has flatulence and is voiding. She is ready step down to regional bed.

## 2023-05-25 NOTE — PROGRESS NOTE ADULT - ASSESSMENT
A/P  Doing well.  Hct stable  No further bleeding.  Tolerating regular diet  Transfer to floor  Probable d/c home later today.

## 2023-05-25 NOTE — DISCHARGE NOTE PROVIDER - NSDCCPTREATMENT_GEN_ALL_CORE_FT
PRINCIPAL PROCEDURE  Procedure: Endoscopic submucosal dissection of rectum using colonoscope  Findings and Treatment:       SECONDARY PROCEDURE  Procedure: Control of postoperative rectal bleeding  Findings and Treatment:

## 2023-05-25 NOTE — PROGRESS NOTE ADULT - SUBJECTIVE AND OBJECTIVE BOX
POD 2, 1 s/p rectal polypectomy and then reoperation for rectal polyp    5 ICU  No bleeding overnight.  Chang out and is voiding well.  No bm's overnight but is passing gas.  tolerating diet.    Vital Signs Last 24 Hrs  T(C): 36.8 (25 May 2023 09:00), Max: 37 (24 May 2023 21:44)  T(F): 98.3 (25 May 2023 09:00), Max: 98.6 (24 May 2023 21:44)  HR: 84 (25 May 2023 09:00) (77 - 102)  BP: 127/71 (25 May 2023 09:00) (108/55 - 137/74)  BP(mean): 90 (25 May 2023 09:00) (75 - 98)  RR: 13 (25 May 2023 09:00) (13 - 23)  SpO2: 97% (25 May 2023 09:00) (94% - 100%)    Parameters below as of 25 May 2023 09:00  Patient On (Oxygen Delivery Method): room air    abd: benign, soft, not distended  ext: without calf tenderness    UO excellent                          12.4   9.60  )-----------( 145      ( 25 May 2023 05:30 )             35.7   05-25    138  |  107  |  9   ----------------------------<  112<H>  4.2   |  24  |  0.51    Ca    7.6<L>      25 May 2023 05:30  Phos  1.7     05-25  Mg     2.1     05-25    TPro  5.7<L>  /  Alb  3.5  /  TBili  0.3  /  DBili  x   /  AST  23  /  ALT  17  /  AlkPhos  35<L>  05-24

## 2023-05-25 NOTE — DISCHARGE NOTE PROVIDER - NSDCFUADDINST_GEN_ALL_CORE_FT
Warning Signs:  Please call your doctor or nurse practitioner if you experience the following:  *You experience new chest pain, pressure, squeezing or tightness.  *New or worsening cough, shortness of breath, or wheeze.  *If you are vomiting and cannot keep down fluids or your medications.  *You are getting dehydrated due to continued vomiting, diarrhea, or other reasons. Signs of dehydration include dry mouth, rapid heartbeat, or feeling dizzy or faint when standing.  *You see blood or dark/black material when you vomit or have a bowel movement.  *You experience burning when you urinate, have blood in your urine, or experience a discharge.  *Your pain is not improving within 8-12 hours or is not gone within 24 hours. Call or return immediately if your pain is getting worse, changes location, or moves to your chest or back.  *You have shaking chills, or fever greater than 101.5 degrees Fahrenheit or 38 degrees Celsius.  *Any change in your symptoms, or any new symptoms that concern you.   Follow up with Dr. Nina in 1-2 weeks. Call the office at [phone] to schedule your appointment. Ambulate as tolerated, but no heavy lifting (>10lbs) or strenuous exercise. You should be urinating at least 3-4x per day. Call the office if you experience increasing abdominal pain, nausea, vomiting, or temperature >101 F.    Warning Signs:  Please call your doctor or nurse practitioner if you experience the following:  *You experience new chest pain, pressure, squeezing or tightness.  *New or worsening cough, shortness of breath, or wheeze.  *If you are vomiting and cannot keep down fluids or your medications.  *You are getting dehydrated due to continued vomiting, diarrhea, or other reasons. Signs of dehydration include dry mouth, rapid heartbeat, or feeling dizzy or faint when standing.  *You see blood or dark/black material when you vomit or have a bowel movement.  *You experience burning when you urinate, have blood in your urine, or experience a discharge.  *Your pain is not improving within 8-12 hours or is not gone within 24 hours. Call or return immediately if your pain is getting worse, changes location, or moves to your chest or back.  *You have shaking chills, or fever greater than 101.5 degrees Fahrenheit or 38 degrees Celsius.  *Any change in your symptoms, or any new symptoms that concern you.    New medications:   - Take colace 1 tablet every 8 hours    Follow up with Dr. Nina in 1-2 weeks. Call the office at [phone] to schedule your appointment. Ambulate as tolerated, but no heavy lifting (>10lbs) or strenuous exercise. You should be urinating at least 3-4x per day. Call the office if you experience increasing abdominal pain, nausea, vomiting, or temperature >101 F.    Warning Signs:  Please call your doctor or nurse practitioner if you experience the following:  *You experience new chest pain, pressure, squeezing or tightness.  *New or worsening cough, shortness of breath, or wheeze.  *If you are vomiting and cannot keep down fluids or your medications.  *You are getting dehydrated due to continued vomiting, diarrhea, or other reasons. Signs of dehydration include dry mouth, rapid heartbeat, or feeling dizzy or faint when standing.  *You see blood or dark/black material when you vomit or have a bowel movement.  *You experience burning when you urinate, have blood in your urine, or experience a discharge.  *Your pain is not improving within 8-12 hours or is not gone within 24 hours. Call or return immediately if your pain is getting worse, changes location, or moves to your chest or back.  *You have shaking chills, or fever greater than 101.5 degrees Fahrenheit or 38 degrees Celsius.  *Any change in your symptoms, or any new symptoms that concern you.    New medications:   - Take colace 1 tablet every 8 hours   - Take over the counter tylenol 650mg every 6 hours as needed for moderate to severe pain or you may take 1000mg every 6 hours as needed for severe pain. Do not exceed 4000mg of tylenol in 24 hours.

## 2023-05-25 NOTE — PROGRESS NOTE ADULT - SUBJECTIVE AND OBJECTIVE BOX
24 hr events: Chang removed at 6:00 PM on 05/24.    SUBJECTIVE:  Patient says she is doing good. She has pain around her rectum rated 4/10, and some pain in the throat from anesthesia during surgery, both unchanged from yesterday. She reports no rectal bleeding. She has a lot of flatulence but has not had a bowel movement since the operation on 05/23. Chang was removed on 05/24 and she is voiding on her own. She is able to walk with assistance to go urinate and is sitting on a chair. She is tolerating her diet.      MEDICATIONS  (STANDING):  acetaminophen     Tablet .. 650 milliGRAM(s) Oral every 6 hours  chlorhexidine 2% Cloths 1 Application(s) Topical <User Schedule>  dextrose 5%. 1000 milliLiter(s) (50 mL/Hr) IV Continuous <Continuous>  dextrose 5%. 1000 milliLiter(s) (100 mL/Hr) IV Continuous <Continuous>  dextrose 50% Injectable 25 Gram(s) IV Push once  dextrose 50% Injectable 12.5 Gram(s) IV Push once  dextrose 50% Injectable 25 Gram(s) IV Push once  glucagon  Injectable 1 milliGRAM(s) IntraMuscular once  insulin lispro (ADMELOG) corrective regimen sliding scale   SubCutaneous three times a day before meals  insulin lispro (ADMELOG) corrective regimen sliding scale   SubCutaneous at bedtime  pantoprazole    Tablet 40 milliGRAM(s) Oral before breakfast  potassium phosphate / sodium phosphate Powder (PHOS-NaK) 2 Packet(s) Oral once    MEDICATIONS  (PRN):  aluminum hydroxide/magnesium hydroxide/simethicone Suspension 30 milliLiter(s) Oral every 4 hours PRN Dyspepsia  benzocaine/menthol Lozenge 1 Lozenge Oral every 2 hours PRN Sore Throat  dextrose Oral Gel 15 Gram(s) Oral once PRN Blood Glucose LESS THAN 70 milliGRAM(s)/deciliter      Chang:	  [ ] None	[ ] Daily Chang Order Placed	   Indication:	  [ ] Strict I and O's    [ ] Obstruction     [ ] Incontinence + Stage 3 or 4 Decubitus  Central Line:  [ ] None	   [ ]  Medication / TPN Administration     [ ] No Peripheral IV     ICU Vital Signs Last 24 Hrs  T(C): 36.9 (25 May 2023 05:30), Max: 37 (24 May 2023 21:44)  T(F): 98.4 (25 May 2023 05:30), Max: 98.6 (24 May 2023 21:44)  HR: 78 (25 May 2023 06:00) (77 - 102)  BP: 120/64 (25 May 2023 06:00) (108/55 - 137/74)  BP(mean): 87 (25 May 2023 06:00) (75 - 98)  ABP: 140/74 (24 May 2023 15:00) (128/66 - 147/77)  ABP(mean): 102 (24 May 2023 15:00) (92 - 104)  RR: 14 (25 May 2023 06:00) (14 - 23)  SpO2: 95% (25 May 2023 06:00) (94% - 100%)    O2 Parameters below as of 25 May 2023 07:00  Patient On (Oxygen Delivery Method): room air            Physical Exam:  General: NAD  HEENT: NC/AT, EOMI, normal hearing, no oral lesions, oral mucosa moist  Pulmonary: Nonlabored breathing, no respiratory distress, CTA-B, no rales, wheezes, or rhonchi  Cardiovascular: NSR, no murmurs, +S1/S2  Abdominal: soft, NT/ND, +BS no organomegaly  Extremities: WWP, 5/5 strength x 4, no clubbing/cyanosis/edema  Neuro: A/O x3, CNs II-XII grossly intact, normal motor/sensation, no focal deficits  Pulses: palpable distal pulses    Lines/tubes/drains:    Vent settings:      I&O's Summary    23 May 2023 07:01  -  24 May 2023 07:00  --------------------------------------------------------  IN: 3420 mL / OUT: 1500 mL / NET: 1920 mL    24 May 2023 07:01  -  25 May 2023 06:59  --------------------------------------------------------  IN: 100 mL / OUT: 2835 mL / NET: -2735 mL        LABS:                        12.4   9.60  )-----------( 145      ( 25 May 2023 05:30 )             35.7     05-25    138  |  107  |  9   ----------------------------<  112<H>  4.2   |  24  |  0.51    Ca    7.6<L>      25 May 2023 05:30  Phos  1.7     05-25  Mg     2.1     05-25    TPro  5.7<L>  /  Alb  3.5  /  TBili  0.3  /  DBili  x   /  AST  23  /  ALT  17  /  AlkPhos  35<L>  05-24    PT/INR - ( 24 May 2023 08:23 )   PT: 13.1 sec;   INR: 1.10          PTT - ( 24 May 2023 08:23 )  PTT:30.5 sec    CAPILLARY BLOOD GLUCOSE      POCT Blood Glucose.: 111 mg/dL (24 May 2023 23:42)  POCT Blood Glucose.: 95 mg/dL (24 May 2023 16:35)  POCT Blood Glucose.: 168 mg/dL (24 May 2023 08:19)    LIVER FUNCTIONS - ( 24 May 2023 08:23 )  Alb: 3.5 g/dL / Pro: 5.7 g/dL / ALK PHOS: 35 U/L / ALT: 17 U/L / AST: 23 U/L / GGT: x             Cultures:    Drips:    RADIOLOGY & ADDITIONAL STUDIES:     24 hr events: Chang removed at 6:00 PM on 05/24.    SUBJECTIVE:  Patient says she is doing good. She has pain around her rectum rated 4/10, and some pain in the throat from anesthesia during surgery, both unchanged from yesterday. She reports no rectal bleeding. She has a lot of flatulence but has not had a bowel movement since the operation on 05/23. Chang was removed on 05/24 and she is voiding on her own. She is able to walk with assistance to go urinate and is sitting on a chair. She is tolerating her diet.      MEDICATIONS  (STANDING):  acetaminophen     Tablet .. 650 milliGRAM(s) Oral every 6 hours  chlorhexidine 2% Cloths 1 Application(s) Topical <User Schedule>  dextrose 5%. 1000 milliLiter(s) (50 mL/Hr) IV Continuous <Continuous>  dextrose 5%. 1000 milliLiter(s) (100 mL/Hr) IV Continuous <Continuous>  dextrose 50% Injectable 25 Gram(s) IV Push once  dextrose 50% Injectable 12.5 Gram(s) IV Push once  dextrose 50% Injectable 25 Gram(s) IV Push once  glucagon  Injectable 1 milliGRAM(s) IntraMuscular once  insulin lispro (ADMELOG) corrective regimen sliding scale   SubCutaneous three times a day before meals  insulin lispro (ADMELOG) corrective regimen sliding scale   SubCutaneous at bedtime  pantoprazole    Tablet 40 milliGRAM(s) Oral before breakfast  potassium phosphate / sodium phosphate Powder (PHOS-NaK) 2 Packet(s) Oral once    MEDICATIONS  (PRN):  aluminum hydroxide/magnesium hydroxide/simethicone Suspension 30 milliLiter(s) Oral every 4 hours PRN Dyspepsia  benzocaine/menthol Lozenge 1 Lozenge Oral every 2 hours PRN Sore Throat  dextrose Oral Gel 15 Gram(s) Oral once PRN Blood Glucose LESS THAN 70 milliGRAM(s)/deciliter    ICU Vital Signs Last 24 Hrs  T(C): 36.9 (25 May 2023 05:30), Max: 37 (24 May 2023 21:44)  T(F): 98.4 (25 May 2023 05:30), Max: 98.6 (24 May 2023 21:44)  HR: 78 (25 May 2023 06:00) (77 - 102)  BP: 120/64 (25 May 2023 06:00) (108/55 - 137/74)  BP(mean): 87 (25 May 2023 06:00) (75 - 98)  ABP: 140/74 (24 May 2023 15:00) (128/66 - 147/77)  ABP(mean): 102 (24 May 2023 15:00) (92 - 104)  RR: 14 (25 May 2023 06:00) (14 - 23)  SpO2: 95% (25 May 2023 06:00) (94% - 100%)    O2 Parameters below as of 25 May 2023 07:00  Patient On (Oxygen Delivery Method): room air      Physical Exam:  General: NAD  HEENT: NC/AT, EOMI, normal hearing, no oral lesions, oral mucosa moist  Pulmonary: Nonlabored breathing, no respiratory distress, CTA-B, no rales, wheezes, or rhonchi  Cardiovascular: NSR, no murmurs, +S1/S2  Abdominal: soft, NT/ND, +BS no organomegaly  Extremities: WWP, 5/5 strength x 4, no clubbing/cyanosis/edema  Neuro: A/O x3, CNs II-XII grossly intact, normal motor/sensation, no focal deficits  Pulses: palpable distal pulses    I&O's Summary    23 May 2023 07:01  -  24 May 2023 07:00  --------------------------------------------------------  IN: 3420 mL / OUT: 1500 mL / NET: 1920 mL    24 May 2023 07:01  -  25 May 2023 06:59  --------------------------------------------------------  IN: 100 mL / OUT: 2835 mL / NET: -2735 mL        LABS:                        12.4   9.60  )-----------( 145      ( 25 May 2023 05:30 )             35.7     05-25    138  |  107  |  9   ----------------------------<  112<H>  4.2   |  24  |  0.51    Ca    7.6<L>      25 May 2023 05:30  Phos  1.7     05-25  Mg     2.1     05-25    TPro  5.7<L>  /  Alb  3.5  /  TBili  0.3  /  DBili  x   /  AST  23  /  ALT  17  /  AlkPhos  35<L>  05-24    PT/INR - ( 24 May 2023 08:23 )   PT: 13.1 sec;   INR: 1.10          PTT - ( 24 May 2023 08:23 )  PTT:30.5 sec    CAPILLARY BLOOD GLUCOSE      POCT Blood Glucose.: 111 mg/dL (24 May 2023 23:42)  POCT Blood Glucose.: 95 mg/dL (24 May 2023 16:35)  POCT Blood Glucose.: 168 mg/dL (24 May 2023 08:19)    LIVER FUNCTIONS - ( 24 May 2023 08:23 )  Alb: 3.5 g/dL / Pro: 5.7 g/dL / ALK PHOS: 35 U/L / ALT: 17 U/L / AST: 23 U/L / GGT: x

## 2023-05-25 NOTE — DISCHARGE NOTE PROVIDER - HOSPITAL COURSE
67yFemale hx breast Ca s/p L mastectomy 2020 w. recon 2021, had incomplete tubillovillous adenoma rectal polyp removal 3/20/23, presented to [facility] 5/23 for polyp resection. Patient was taken to OR on 5/23 for rectal polyp resection; rapid response called on POD#1 for multiple bouts of bloody BM's along w/ relative hypotension, BP down to 85/58 compared to baseline 's and tachycardia of 130-140's. Pt returned to OR immediately, found bleeding posterior rectum close to wound, hemostasis acheived with cauterization. Pt transferred to SICU post-op for close monitoring post operatively. The remainder of her postoperative course was unremarkable with step down from surgical ICU, advancement of diet, passing trial of void, and pain control. On day of discharge patient was stable to be d/c'd home. 67yFemale hx breast Ca s/p L mastectomy 2020 w. recon 2021, had incomplete tubovillous adenoma rectal polyp removal 3/20/23, presented to [facility] 5/23 for polyp resection. Patient was taken to OR on 5/23 for rectal polyp resection; rapid response called on POD#1 for multiple bouts of bloody BM's along w/ relative hypotension, BP down to 85/58 compared to baseline 's and tachycardia of 130-140's. Pt returned to OR immediately, found bleeding posterior rectum close to wound, hemostasis achieved with cauterization. Pt transferred to SICU post-op for close monitoring post operatively. The remainder of her postoperative course was unremarkable with step down from surgical ICU, advancement of diet, passing trial of void, pain control, having normal nonbloody BMs. On day of discharge patient was stable to be discharged home.

## 2023-05-25 NOTE — DISCHARGE NOTE PROVIDER - NSDCMRMEDTOKEN_GEN_ALL_CORE_FT
alendronate 70 mg oral tablet: 1 orally once a week  anastrozole 1 mg oral tablet: 1 orally once a day  omeprazole:    acetaminophen 325 mg oral tablet: 2 tab(s) orally every 6 hours  alendronate 70 mg oral tablet: 1 orally once a week  anastrozole 1 mg oral tablet: 1 orally once a day  omeprazole:    acetaminophen 325 mg oral tablet: 2 tab(s) orally every 6 hours  alendronate 70 mg oral tablet: 1 orally once a week  anastrozole 1 mg oral tablet: 1 orally once a day  docusate sodium 100 mg oral tablet: 1 tab(s) orally every 8 hours  omeprazole:

## 2023-05-25 NOTE — DISCHARGE NOTE PROVIDER - CARE PROVIDER_API CALL
René Nina)  ColonRectal Surgery  [address], [place] [address]  Phone: [phone]  Fax: [phone]  Follow Up Time: 1 week

## 2023-05-25 NOTE — DISCHARGE NOTE PROVIDER - NSDCCPCAREPLAN_GEN_ALL_CORE_FT
PRINCIPAL DISCHARGE DIAGNOSIS  Diagnosis: Rectal polyp  Assessment and Plan of Treatment:       SECONDARY DISCHARGE DIAGNOSES  Diagnosis: Bleeding per rectum  Assessment and Plan of Treatment:

## 2023-05-25 NOTE — H&P ADULT - ASSESSMENT
67yFemale hx breast Ca s/p L mastectomy 2020 w. recon 2021, had incomplete tubillovillous adenoma rectal polyp removal 3/20/23, presented to [facility] 5/23 for polyp resection.     Plan:  to OR  admission post op

## 2023-05-25 NOTE — PROGRESS NOTE ADULT - ATTENDING COMMENTS
s/p villous adenoma resection c/b acute blood loss anemia with bleeding and RTOR for hemostasis  physical as above  H/H stable  perfusion is adequate  tolerating diet

## 2023-05-25 NOTE — PROGRESS NOTE ADULT - ASSESSMENT
This is a 67F hx breast Ca s/p L mastectomy 2020 w. recon 2021, s/p incomplete tubillovillous adenoma rectal polyp removal 3/20/23, presented to [facility] 5/23 for polyp resection. taken to OR on 5/23 for rectal polyp resection, C/B rectal bleeding, found active bleeding posterior rectum close to wound, hemostasis acheived with cauterization 5/24/23 transferred to SICU post-op for close monitoring.     - Diet ad nati  - Hold SQH  - Step down from ICU today. Possible discharge later

## 2023-05-25 NOTE — PROGRESS NOTE ADULT - SUBJECTIVE AND OBJECTIVE BOX
IDENTIFICATION: This is a 67F hx breast Ca s/p L mastectomy 2020 w. recon 2021, s/p incomplete tubillovillous adenoma rectal polyp removal 3/20/23, presented to [facility] 5/23 for polyp resection. taken to OR on 5/23 for rectal polyp resection, C/B rectal bleeding, found active bleeding posterior rectum close to wound, hemostasis acheived with cauterization 5/24/23 transferred to SICU post-op for close monitoring.     EVENTS:   - Hb 12.4 fro 12.7 postoperatively after RTOR for hemostasis 5/24    SUBJECTIVE:   - Tolerating diet, pain well controlled.   - Denies N/V  - Passing flatus  - Last BM prior to index surgry.  - No blood per rectum in 24 hrs.    MEDICATIONS  (STANDING):  acetaminophen     Tablet .. 650 milliGRAM(s) Oral every 6 hours  chlorhexidine 2% Cloths 1 Application(s) Topical <User Schedule>  dextrose 5%. 1000 milliLiter(s) (50 mL/Hr) IV Continuous <Continuous>  dextrose 5%. 1000 milliLiter(s) (100 mL/Hr) IV Continuous <Continuous>  dextrose 50% Injectable 25 Gram(s) IV Push once  dextrose 50% Injectable 12.5 Gram(s) IV Push once  dextrose 50% Injectable 25 Gram(s) IV Push once  glucagon  Injectable 1 milliGRAM(s) IntraMuscular once  insulin lispro (ADMELOG) corrective regimen sliding scale   SubCutaneous three times a day before meals  insulin lispro (ADMELOG) corrective regimen sliding scale   SubCutaneous at bedtime  pantoprazole    Tablet 40 milliGRAM(s) Oral before breakfast    MEDICATIONS  (PRN):  aluminum hydroxide/magnesium hydroxide/simethicone Suspension 30 milliLiter(s) Oral every 4 hours PRN Dyspepsia  benzocaine/menthol Lozenge 1 Lozenge Oral every 2 hours PRN Sore Throat  dextrose Oral Gel 15 Gram(s) Oral once PRN Blood Glucose LESS THAN 70 milliGRAM(s)/deciliter      Vital Signs Last 24 Hrs  T(C): 36.9 (25 May 2023 05:30), Max: 37 (24 May 2023 21:44)  T(F): 98.4 (25 May 2023 05:30), Max: 98.6 (24 May 2023 21:44)  HR: 92 (25 May 2023 07:00) (77 - 102)  BP: 130/68 (25 May 2023 07:00) (108/55 - 137/74)  BP(mean): 94 (25 May 2023 07:00) (75 - 98)  RR: 20 (25 May 2023 07:00) (14 - 23)  SpO2: 97% (25 May 2023 07:00) (94% - 100%)    Parameters below as of 25 May 2023 08:00  Patient On (Oxygen Delivery Method): room air        Neurologic: AAOx4; moving all extremities.   CV: Normal rate, regular rhythm  Pulm: Breathing comfortably  Abd: Soft, non-distended; No TTP throughout.   : No Chang  Skin: No rashes  Extremities: No edema.  Psychiatric: Interacting normally.     I&O's Detail    24 May 2023 07:01  -  25 May 2023 07:00  --------------------------------------------------------  IN:    IV PiggyBack: 100 mL  Total IN: 100 mL    OUT:    Indwelling Catheter - Urethral (mL): 1035 mL    Voided (mL): 2100 mL  Total OUT: 3135 mL    Total NET: -3035 mL          LABS:                        12.4   9.60  )-----------( 145      ( 25 May 2023 05:30 )             35.7     05-25    138  |  107  |  9   ----------------------------<  112<H>  4.2   |  24  |  0.51    Ca    7.6<L>      25 May 2023 05:30  Phos  1.7     05-25  Mg     2.1     05-25    TPro  5.7<L>  /  Alb  3.5  /  TBili  0.3  /  DBili  x   /  AST  23  /  ALT  17  /  AlkPhos  35<L>  05-24    PT/INR - ( 24 May 2023 08:23 )   PT: 13.1 sec;   INR: 1.10          PTT - ( 24 May 2023 08:23 )  PTT:30.5 sec      RADIOLOGY & ADDITIONAL STUDIES:

## 2023-05-25 NOTE — DISCHARGE NOTE PROVIDER - INSTRUCTIONS
Please continue a LOW-FIBER DIET. Listed below are some foods you may eat and those you should avoid.   --Allowed foods:  White bread without nuts and seeds  White rice, plain white pasta, and crackers  Refined hot cereals, such as Cream of Wheat, or cold cereals with less than 1 gram of fiber per serving  Pancakes or waffles made from white refined flour  Most canned or well-cooked vegetables and fruits without skins or seeds  Fruit and vegetable juice with little or no pulp, fruit-flavored drinks, and flavored bains  Tender meat, poultry, fish, eggs and tofu  Milk and foods made from milk — such as yogurt, pudding, ice cream, cheeses and sour cream — if tolerated  Butter, margarine, oils and salad dressings without seeds  --Foods to avoid:  Whole-wheat or whole-grain breads, cereals and pasta  Brown or wild rice and other whole grains, such as oats, kasha, barley and quinoa  Dried fruits and prune juice  Raw fruit, including those with seeds, skin or membranes, such as berries  Raw or undercooked vegetables, including corn; Dried beans, peas and lentils; Seeds and nuts and foods containing them, including peanut butter and other nut butters; Coconut  Popcorn    Please continue a low fiber diet for 1 week at home. Follow up with Dr. Nina for further instructions.

## 2023-05-25 NOTE — H&P ADULT - NSICDXPASTMEDICALHX_GEN_ALL_CORE_FT
PAST MEDICAL HISTORY:  Breast cancer 2019    GERD (gastroesophageal reflux disease)     Osteoporosis     Prediabetes

## 2023-05-26 ENCOUNTER — TRANSCRIPTION ENCOUNTER (OUTPATIENT)
Age: 68
End: 2023-05-26

## 2023-05-26 VITALS
DIASTOLIC BLOOD PRESSURE: 73 MMHG | TEMPERATURE: 98 F | OXYGEN SATURATION: 99 % | HEART RATE: 81 BPM | SYSTOLIC BLOOD PRESSURE: 133 MMHG | RESPIRATION RATE: 16 BRPM

## 2023-05-26 PROBLEM — C50.919 MALIGNANT NEOPLASM OF UNSPECIFIED SITE OF UNSPECIFIED FEMALE BREAST: Chronic | Status: ACTIVE | Noted: 2023-05-22

## 2023-05-26 PROBLEM — M81.0 AGE-RELATED OSTEOPOROSIS WITHOUT CURRENT PATHOLOGICAL FRACTURE: Chronic | Status: ACTIVE | Noted: 2023-05-22

## 2023-05-26 PROBLEM — K21.9 GASTRO-ESOPHAGEAL REFLUX DISEASE WITHOUT ESOPHAGITIS: Chronic | Status: ACTIVE | Noted: 2023-05-23

## 2023-05-26 PROCEDURE — 80053 COMPREHEN METABOLIC PANEL: CPT

## 2023-05-26 PROCEDURE — P9016: CPT

## 2023-05-26 PROCEDURE — 82962 GLUCOSE BLOOD TEST: CPT

## 2023-05-26 PROCEDURE — 82330 ASSAY OF CALCIUM: CPT

## 2023-05-26 PROCEDURE — 85027 COMPLETE CBC AUTOMATED: CPT

## 2023-05-26 PROCEDURE — 86901 BLOOD TYPING SEROLOGIC RH(D): CPT

## 2023-05-26 PROCEDURE — 36415 COLL VENOUS BLD VENIPUNCTURE: CPT

## 2023-05-26 PROCEDURE — 86920 COMPATIBILITY TEST SPIN: CPT

## 2023-05-26 PROCEDURE — 83605 ASSAY OF LACTIC ACID: CPT

## 2023-05-26 PROCEDURE — 88305 TISSUE EXAM BY PATHOLOGIST: CPT

## 2023-05-26 PROCEDURE — 86900 BLOOD TYPING SEROLOGIC ABO: CPT

## 2023-05-26 PROCEDURE — 82947 ASSAY GLUCOSE BLOOD QUANT: CPT

## 2023-05-26 PROCEDURE — 84132 ASSAY OF SERUM POTASSIUM: CPT

## 2023-05-26 PROCEDURE — 84295 ASSAY OF SERUM SODIUM: CPT

## 2023-05-26 PROCEDURE — 86850 RBC ANTIBODY SCREEN: CPT

## 2023-05-26 PROCEDURE — 85018 HEMOGLOBIN: CPT

## 2023-05-26 PROCEDURE — 83036 HEMOGLOBIN GLYCOSYLATED A1C: CPT

## 2023-05-26 PROCEDURE — 85730 THROMBOPLASTIN TIME PARTIAL: CPT

## 2023-05-26 PROCEDURE — 85610 PROTHROMBIN TIME: CPT

## 2023-05-26 PROCEDURE — 80048 BASIC METABOLIC PNL TOTAL CA: CPT

## 2023-05-26 PROCEDURE — 36430 TRANSFUSION BLD/BLD COMPNT: CPT

## 2023-05-26 PROCEDURE — 83735 ASSAY OF MAGNESIUM: CPT

## 2023-05-26 PROCEDURE — 86923 COMPATIBILITY TEST ELECTRIC: CPT

## 2023-05-26 PROCEDURE — 84100 ASSAY OF PHOSPHORUS: CPT

## 2023-05-26 RX ORDER — ACETAMINOPHEN 500 MG
2 TABLET ORAL
Qty: 0 | Refills: 0 | DISCHARGE
Start: 2023-05-26

## 2023-05-26 RX ORDER — DOCUSATE SODIUM 100 MG
1 CAPSULE ORAL
Qty: 42 | Refills: 0
Start: 2023-05-26 | End: 2023-06-08

## 2023-05-26 RX ADMIN — Medication 650 MILLIGRAM(S): at 00:16

## 2023-05-26 RX ADMIN — Medication 650 MILLIGRAM(S): at 06:22

## 2023-05-26 RX ADMIN — PANTOPRAZOLE SODIUM 40 MILLIGRAM(S): 20 TABLET, DELAYED RELEASE ORAL at 06:22

## 2023-06-02 ENCOUNTER — APPOINTMENT (OUTPATIENT)
Dept: COLORECTAL SURGERY | Facility: CLINIC | Age: 68
End: 2023-06-02
Payer: MEDICARE

## 2023-06-02 VITALS
HEART RATE: 87 BPM | WEIGHT: 126 LBS | BODY MASS INDEX: 25.4 KG/M2 | SYSTOLIC BLOOD PRESSURE: 144 MMHG | OXYGEN SATURATION: 100 % | TEMPERATURE: 98.5 F | HEIGHT: 59 IN | DIASTOLIC BLOOD PRESSURE: 77 MMHG

## 2023-06-02 DIAGNOSIS — L29.0 PRURITUS ANI: ICD-10-CM

## 2023-06-02 PROBLEM — R73.03 PREDIABETES: Chronic | Status: ACTIVE | Noted: 2023-05-23

## 2023-06-02 PROCEDURE — 99024 POSTOP FOLLOW-UP VISIT: CPT

## 2023-06-02 RX ORDER — HYDROCORTISONE 25 MG/G
2.5 CREAM TOPICAL 3 TIMES DAILY
Qty: 15 | Refills: 0 | Status: ACTIVE | COMMUNITY
Start: 2023-06-02 | End: 1900-01-01

## 2023-06-02 NOTE — PHYSICAL EXAM
[Abdomen Masses] : No abdominal masses [Abdomen Tenderness] : ~T No ~M abdominal tenderness [de-identified] : benign [de-identified] : ext: WNL,  no digital done today to avoid disrupting her suture line.

## 2023-06-02 NOTE — HISTORY OF PRESENT ILLNESS
[FreeTextEntry1] : 68 y/o F s/p ESD for rectal polyp on 5/23/23. Patient was taken back to OR again on 5/24/23 for rectal bleeding. Denies rectal pain, but has some discomfort with long time sitting. Bowel movements are daily x 1-2 and soft. Has some bright red blood on tissue sometime when she strains. Has some perianal itching. Blood is only on tissue. Denies abdominal pain, n/v/d, fever or chills. \par

## 2023-06-07 DIAGNOSIS — Z90.12 ACQUIRED ABSENCE OF LEFT BREAST AND NIPPLE: ICD-10-CM

## 2023-06-07 DIAGNOSIS — D12.8 BENIGN NEOPLASM OF RECTUM: ICD-10-CM

## 2023-06-07 DIAGNOSIS — M81.0 AGE-RELATED OSTEOPOROSIS WITHOUT CURRENT PATHOLOGICAL FRACTURE: ICD-10-CM

## 2023-06-07 DIAGNOSIS — E83.42 HYPOMAGNESEMIA: ICD-10-CM

## 2023-06-07 DIAGNOSIS — K62.1 RECTAL POLYP: ICD-10-CM

## 2023-06-07 DIAGNOSIS — R57.8 OTHER SHOCK: ICD-10-CM

## 2023-06-07 DIAGNOSIS — D62 ACUTE POSTHEMORRHAGIC ANEMIA: ICD-10-CM

## 2023-06-07 DIAGNOSIS — Z85.3 PERSONAL HISTORY OF MALIGNANT NEOPLASM OF BREAST: ICD-10-CM

## 2023-06-07 DIAGNOSIS — K21.9 GASTRO-ESOPHAGEAL REFLUX DISEASE WITHOUT ESOPHAGITIS: ICD-10-CM

## 2023-06-07 DIAGNOSIS — Z79.899 OTHER LONG TERM (CURRENT) DRUG THERAPY: ICD-10-CM

## 2023-06-07 DIAGNOSIS — R73.03 PREDIABETES: ICD-10-CM

## 2023-06-07 DIAGNOSIS — K91.871 POSTPROCEDURAL HEMATOMA OF A DIGESTIVE SYSTEM ORGAN OR STRUCTURE FOLLOWING OTHER PROCEDURE: ICD-10-CM

## 2023-06-08 NOTE — PATIENT PROFILE ADULT - HISTORY OF COVID-19 VACCINATION
June 8, 2023      To Whom It May Concern:      Jonatan JESSICA Worley was seen in our Emergency Department today, 06/08/23.     Sincerely,        Shireen Morgan RN        
Yes

## 2023-06-26 ENCOUNTER — APPOINTMENT (OUTPATIENT)
Dept: COLORECTAL SURGERY | Facility: CLINIC | Age: 68
End: 2023-06-26
Payer: MEDICARE

## 2023-06-26 VITALS
TEMPERATURE: 98.7 F | BODY MASS INDEX: 25.6 KG/M2 | SYSTOLIC BLOOD PRESSURE: 148 MMHG | HEART RATE: 86 BPM | OXYGEN SATURATION: 100 % | WEIGHT: 127 LBS | DIASTOLIC BLOOD PRESSURE: 74 MMHG | HEIGHT: 59 IN

## 2023-06-26 PROCEDURE — 99024 POSTOP FOLLOW-UP VISIT: CPT

## 2023-06-26 PROCEDURE — 46600 DIAGNOSTIC ANOSCOPY SPX: CPT | Mod: 58

## 2023-06-26 NOTE — ASSESSMENT
[FreeTextEntry1] : About 1 month s/p ESD./  transanall removal of large polyp.\par Had major bleed POD1 that rquired transfusions.\par Had small amounts of bleeding since.  Mostly having 1 BM/day and will go 2-4 days between seeing any blood.\par Usually sees small amount but has seen .\par Had bigger bloody BM on the 18th or 19th.  Last few days has seen no blood.\par Exam today reveals that wound is healing but is not fully healed.\par No need for intervention now.\par She understands to call  or go to an ER if heavy bleeding develops (unlikely).\florencio Flex sig in September '23 planned.

## 2023-06-26 NOTE — PHYSICAL EXAM
[Excoriation] : no perianal excoriation [Fistula] : no fistulas [Nonprolapsing] : a nonprolapsing (grade I) [Tender, Swollen] : nontender, non-swollen [Thrombosed] : that was not thrombosed [Skin Tags] : there were no residual hemorrhoidal skin tags seen [Normal] : was normal [None] : there was no rectal mass  [Stool Sample Taken] : no stool obtained on rectal exam [Gross Blood] : no gross blood [de-identified] : ext: WNL.  digital: can feel mildly indurated area posteriorly.  anoscopy: scarred area seen, cannot see well the posterior wound that is in sacral hollow.  no blood seen.

## 2023-06-26 NOTE — HISTORY OF PRESENT ILLNESS
[FreeTextEntry1] : : [id number]\par 68 y/o F s/p ESD for rectal polyp on 5/23/23. Patient was taken back to OR again on 5/24/23 for rectal bleeding. On 6/18/23 she had an episode of bright red blood per rectum with bowel movement. Yesterday again she had small amount of blood like a ring on stool. Today no bleeding. She has hemorrhoids. Does not prolapse. \par Bowel movements are daily x 1-2 and soft. Needs to strain a little. \par Denies fever or chills. Denies abdominal pain. Denies weakness or dizziness. \par Eating well. Denies any urinary symptoms.  \par

## 2023-06-26 NOTE — REVIEW OF SYSTEMS
[Negative] : Heme/Lymph [Fever] : no fever [Chills] : no chills [Feeling Poorly] : not feeling poorly [Feeling Tired] : not feeling tired [Recent Weight Gain (___ Lbs)] : no recent weight gain [Recent Weight Loss (___ Lbs)] : no recent weight loss [Chest Pain] : no chest pain [Palpitations] : no palpitations [Leg Claudication] : no intermittent leg claudication [Lower Ext Edema] : no lower extremity edema [Shortness Of Breath] : no shortness of breath [Wheezing] : no wheezing [Cough] : no cough [SOB on Exertion] : no shortness of breath during exertion [Orthopnea] : no orthopnea [PND] : no PND [Abdominal Pain] : no abdominal pain [Vomiting] : no vomiting [Constipation] : no constipation [Diarrhea] : no diarrhea [Dysuria] : no dysuria [Incontinence] : no incontinence [Pelvic Pain] : no pelvic pain [Dysmenorrhea] : no dysmenorrhea [Skin Lesions] : no skin lesions [Skin Wound] : no skin wound [Itching] : no itching [Change In A Mole] : no change in a mole [Confused] : no confusion [Convulsions] : no convulsions [Dizziness] : no dizziness [Fainting] : no fainting [Suicidal] : not suicidal [Sleep Disturbances] : no sleep disturbances [Anxiety] : no anxiety [Depression] : no depression [Easy Bleeding] : no tendency for easy bleeding [Easy Bruising] : no tendency for easy bruising

## 2023-09-06 ENCOUNTER — RESULT REVIEW (OUTPATIENT)
Age: 68
End: 2023-09-06

## 2023-09-06 ENCOUNTER — APPOINTMENT (OUTPATIENT)
Dept: COLORECTAL SURGERY | Facility: AMBULATORY SURGERY CENTER | Age: 68
End: 2023-09-06
Payer: MEDICARE

## 2023-09-06 PROCEDURE — 45331 SIGMOIDOSCOPY AND BIOPSY: CPT

## 2023-09-11 ENCOUNTER — NON-APPOINTMENT (OUTPATIENT)
Age: 68
End: 2023-09-11

## 2024-07-24 ENCOUNTER — APPOINTMENT (OUTPATIENT)
Dept: COLORECTAL SURGERY | Facility: CLINIC | Age: 69
End: 2024-07-24
Payer: MEDICARE

## 2024-07-24 VITALS
SYSTOLIC BLOOD PRESSURE: 152 MMHG | DIASTOLIC BLOOD PRESSURE: 76 MMHG | HEIGHT: 59 IN | OXYGEN SATURATION: 100 % | TEMPERATURE: 98.1 F | HEART RATE: 99 BPM | RESPIRATION RATE: 18 BRPM | WEIGHT: 121 LBS | BODY MASS INDEX: 24.39 KG/M2

## 2024-07-24 PROCEDURE — 46600 DIAGNOSTIC ANOSCOPY SPX: CPT

## 2024-07-24 NOTE — PHYSICAL EXAM
[Abdomen Masses] : No abdominal masses [Abdomen Tenderness] : ~T No ~M abdominal tenderness [Excoriation] : excoriations [Fistula] : no fistulas [Ulcer ___ cm] : no ulcers [Nonprolapsing] : a nonprolapsing (grade I) [Tender, Swollen] : nontender, non-swollen [Thrombosed] : that was not thrombosed [Skin Tags] : there were no residual hemorrhoidal skin tags seen [Normal] : was normal [None] : there was no rectal mass  [Stool Sample Taken] : no stool obtained on rectal exam [Gross Blood] : no gross blood [de-identified] : left [id number], small 2cm2 patch of ? shingles type changes (red, open small   ? vessicles).  NOt tender but itches.  digital rectal: no masses, tone WNL, no impaction.  Anoscopy: grade 0 hemorrhoids and no mucosal lesions seen [de-identified] : benign [de-identified] : see above, left buttock, ? shingles (vs HSV)

## 2024-07-24 NOTE — REVIEW OF SYSTEMS
[Negative] : Heme/Lymph [Fever] : no fever [Chills] : no chills [Feeling Poorly] : not feeling poorly [Feeling Tired] : not feeling tired [Recent Weight Gain (___ Lbs)] : no recent weight gain [Recent Weight Loss (___ Lbs)] : no recent weight loss [Sore Throat] : no sore throat [Chest Pain] : no chest pain [Palpitations] : no palpitations [Cough] : no cough [SOB on Exertion] : no shortness of breath during exertion [Abdominal Pain] : no abdominal pain [Vomiting] : no vomiting [Constipation] : no constipation [Diarrhea] : no diarrhea [Dysuria] : no dysuria [Incontinence] : no incontinence [Pelvic Pain] : no pelvic pain [Dysmenorrhea] : no dysmenorrhea [Itching] : no itching [Confused] : no confusion [Dizziness] : no dizziness [Fainting] : no fainting [Anxiety] : no anxiety [Depression] : no depression

## 2024-07-24 NOTE — ASSESSMENT
[FreeTextEntry1] : A/P  Has hx shingles and has left buttock lesion.  Current finding is likely shingles in my view although not too symptomatic  Bowel funciton been good. On the limited exam today no recurrence was noted.  Anoscopy done.  Repete flex sig in September at which time a more thorough evaluation can be done.  C/o RUQ and back discomfort after greasy foods but [place] show [place]stones.  F/u with PMD for that issue.

## 2024-07-24 NOTE — HISTORY OF PRESENT ILLNESS
[FreeTextEntry1] : :   69 y/o F PMHx of breast cancer (On Anastrozole), referred by Dr. Delarosa for 20mm polyp in distal rectum found during colonoscopy on 3/20/23. Polyps was removed with hot snare and removal was incomplete as per report. Pathology showed Fragments of tubular adenoma with Minute focus of high-grade dysplasia. Ms. Dorman is now s/p ESD/ transanal removal of rectal polyp on 5/23/23.  Pathology: 1. Rectum, polypectomy: Tubulovillous adenoma. 2. Fragment of rectal polyp: Fragments of tubulovillous adenoma. 3. Additional fragment: Hyperplastic polyp.  She had follow up flexible sigmoidoscopy on 9/6/23. Biopsies from the polypectomy showed benign colonic mucosa. Recommended f/u flex-sig in 9/2024.  She is here for follow up. She has been having Right upper quadrant pain that is radiating to back. Pain comes when she eats greasy food. Denies nausea or vomiting. Denies fever or chill. She had US abdominal done in 3/2024 that showed fatty liver.  Bowel movements are daily x1-2. Stools are soft. Denies pain with BM. Had small amount of rectal bleeding a month ago.

## 2024-09-16 ENCOUNTER — APPOINTMENT (OUTPATIENT)
Dept: COLORECTAL SURGERY | Facility: CLINIC | Age: 69
End: 2024-09-16
Payer: MEDICARE

## 2024-09-16 ENCOUNTER — LABORATORY RESULT (OUTPATIENT)
Age: 69
End: 2024-09-16

## 2024-09-16 VITALS
SYSTOLIC BLOOD PRESSURE: 144 MMHG | OXYGEN SATURATION: 100 % | HEIGHT: 59 IN | HEART RATE: 87 BPM | DIASTOLIC BLOOD PRESSURE: 81 MMHG | TEMPERATURE: 97.8 F | WEIGHT: 123 LBS | BODY MASS INDEX: 24.8 KG/M2

## 2024-09-16 PROCEDURE — 45331Z: CUSTOM

## 2024-09-16 NOTE — PHYSICAL EXAM
[Excoriation] : no perianal excoriation [Fistula] : no fistulas [Wart] : no warts [Ulcer ___ cm] : no ulcers [Nonprolapsing] : a nonprolapsing (grade I) [Tender, Swollen] : nontender, non-swollen [Thrombosed] : that was not thrombosed [Skin Tags] : there were no residual hemorrhoidal skin tags seen [Normal] : was normal [None] : there was no rectal mass  [Stool Sample Taken] : no stool obtained on rectal exam [Gross Blood] : no gross blood [de-identified] : ext: WNL.  digital: no masses noted.  anoscopy: adult scope, negative for lesions or stenosis

## 2024-09-16 NOTE — HISTORY OF PRESENT ILLNESS
[FreeTextEntry1] : : Dr. Bowman  67 y/o F PMHx of breast cancer (On Anastrozole), referred by Dr. Delarosa for 20mm polyp in distal rectum found during colonoscopy on 3/20/23. Polyps was removed with hot snare and removal was incomplete as per report. Pathology showed Fragments of tubular adenoma with Minute focus of high-grade dysplasia. Ms. Dorman is now s/p ESD/ transanal removal of rectal polyp on 5/23/23.  Pathology: 1. Rectum, polypectomy: Tubulovillous adenoma. 2. Fragment of rectal polyp: Fragments of tubulovillous adenoma. 3. Additional fragment: Hyperplastic polyp.  She had follow-up flexible sigmoidoscopy on 9/6/23. Biopsies from the polypectomy showed benign colonic mucosa. Recommended f/u flex-sig in 9/2024. She is here for flexible sigmoidoscopy.  Patient is c/o lots of gas and RLQ pain. She was having constipation and loose stool. 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 She was treated with oral Flagyl.  Bowel movements are every other day to every 2 days. Stools are soft to hard. Denies any blood in stool. Last time she saw some blood per rectum was about 3 months ago. Dr. Delarosa prescribed her MiraLAX for constipation. She takes it when she does not have BM for 3 days.  Weight stable.

## 2024-09-16 NOTE — ASSESSMENT
[FreeTextEntry1] : A/P  Negative sigmoidoscopy and anoscopy. No evidence of recurrent rectal polyp. RT in 1 year for repeat exam. Has RUQ pain and is getting w/u as per GI MD.  Bx taken and path pending (low suspicion)

## 2024-09-16 NOTE — PROCEDURE
[FreeTextEntry1] : post consent flex sig done Inserted to 40 cm Diverticulosis noted and torturous colon. Scan seen in left posterior area near anus.  Biopsy x 1 taken and sent to pathology No polyps seen. No colitis.  Anoscopy also done and also revealed no lesions.

## 2024-09-16 NOTE — PHYSICAL EXAM
[Excoriation] : no perianal excoriation [Fistula] : no fistulas [Wart] : no warts [Ulcer ___ cm] : no ulcers [Nonprolapsing] : a nonprolapsing (grade I) [Tender, Swollen] : nontender, non-swollen [Thrombosed] : that was not thrombosed [Skin Tags] : there were no residual hemorrhoidal skin tags seen [Normal] : was normal [None] : there was no rectal mass  [Stool Sample Taken] : no stool obtained on rectal exam [Gross Blood] : no gross blood [de-identified] : ext: WNL.  digital: no masses noted.  anoscopy: adult scope, negative for lesions or stenosis

## 2024-09-16 NOTE — REVIEW OF SYSTEMS
[Abdominal Pain] : abdominal pain [Constipation] : constipation [Negative] : Heme/Lymph [Recent Weight Gain (___ Lbs)] : no recent weight gain [Recent Weight Loss (___ Lbs)] : no recent weight loss [Nosebleeds] : no nosebleeds [Nasal Discharge] : no nasal discharge [Sore Throat] : no sore throat [Chest Pain] : no chest pain [Palpitations] : no palpitations [Shortness Of Breath] : no shortness of breath [Wheezing] : no wheezing [Cough] : no cough [SOB on Exertion] : no shortness of breath during exertion [Vomiting] : no vomiting [Diarrhea] : no diarrhea [Dysuria] : no dysuria [Incontinence] : no incontinence [Pelvic Pain] : no pelvic pain [Dysmenorrhea] : no dysmenorrhea

## 2024-09-24 ENCOUNTER — NON-APPOINTMENT (OUTPATIENT)
Age: 69
End: 2024-09-24

## 2025-03-04 NOTE — ASSESSMENT
[FreeTextEntry1] : Doing well.\par Had ESD in combination withy transanal submucosal excision of polyp.\par Path was benign TVA without dysplasia.\par Repeat flex sig in 4 months.\par Had bleeding postop requireing 4 untis PRBC's.\par Spotting at times but no significant bleeding noted.  \par Advised she take vitamins with iron.\par To call for problems.   noninvasive blood pressure monitor

## (undated) DEVICE — GLV 8 PROTEXIS (WHITE)

## (undated) DEVICE — SNARE ELECTROSURG CRESENT 2.0MM X 230CM X 25MM

## (undated) DEVICE — TUBING SUCTION NONCONDUCTIVE 6MM X 12FT

## (undated) DEVICE — KIT ENDO PROCEDURE CUST W/VLV

## (undated) DEVICE — SNARE HOT COLD 15MM DISP

## (undated) DEVICE — FOLEY TRAY 16FR 5CC LF UMETER CLOSED

## (undated) DEVICE — SNARE POLYPECT ROTATABLE 13MM SNGL USE OVAL MICRO

## (undated) DEVICE — LUBRICATING JELLY ONESHOT 1.25OZ

## (undated) DEVICE — DRAPE 1/2 SHEET 40X57"

## (undated) DEVICE — ATTACHMENT DISTAL 4X13.4MM

## (undated) DEVICE — MARKER ENDO SPOT EX

## (undated) DEVICE — NDL INJ SCLERO INTERJECT 25G

## (undated) DEVICE — SUT VICRYL 3-0 27" SH UNDYED

## (undated) DEVICE — NDL INJ SCLERO INTERJECT 23G

## (undated) DEVICE — NDL ELECTRODE ULTRACLEAN 6

## (undated) DEVICE — CARTRIDGE ERBEJET 2 PUMP

## (undated) DEVICE — SNARE POLYP HEXAGONAL MED 27X2.4X240

## (undated) DEVICE — Device

## (undated) DEVICE — FORCEP RADIAL JAW 4 W NDL 2.2MM 2.8MM 240CM ORANGE DISP

## (undated) DEVICE — TUBING STRYKER PNEUMOCLEAR HIGH FLOW

## (undated) DEVICE — SYR LUER LOK 30CC

## (undated) DEVICE — VENODYNE/SCD SLEEVE CALF MEDIUM

## (undated) DEVICE — SYR CONTROL LUER LOK 10CC

## (undated) DEVICE — GLV 7.5 PROTEXIS (WHITE)

## (undated) DEVICE — COAGRASPER  MONOPOLAR HEMO

## (undated) DEVICE — PACK GENERAL MINOR

## (undated) DEVICE — KNIFE DUAL J UPPER

## (undated) DEVICE — COMP INJECTION ELEVIEW AMPOULES 10ML

## (undated) DEVICE — SNARE ENDO POLY CAPTIVATOR II 33MM

## (undated) DEVICE — DRAPE 3/4 SHEET 52X76"

## (undated) DEVICE — BIOPSY FORCEP RADIAL JAW 4 STANDARD WITH NEEDLE

## (undated) DEVICE — GELPOINT PATH TRANSANAL ACCESS 4X5.5CM